# Patient Record
Sex: FEMALE | Race: BLACK OR AFRICAN AMERICAN | NOT HISPANIC OR LATINO | Employment: OTHER | URBAN - METROPOLITAN AREA
[De-identification: names, ages, dates, MRNs, and addresses within clinical notes are randomized per-mention and may not be internally consistent; named-entity substitution may affect disease eponyms.]

---

## 2017-11-28 ENCOUNTER — TRANSCRIBE ORDERS (OUTPATIENT)
Dept: ADMINISTRATIVE | Facility: HOSPITAL | Age: 72
End: 2017-11-28

## 2017-11-28 DIAGNOSIS — Z12.31 VISIT FOR SCREENING MAMMOGRAM: Primary | ICD-10-CM

## 2018-01-04 ENCOUNTER — HOSPITAL ENCOUNTER (OUTPATIENT)
Dept: MAMMOGRAPHY | Facility: HOSPITAL | Age: 73
Discharge: HOME | End: 2018-01-04
Admitting: INTERNAL MEDICINE

## 2018-01-04 DIAGNOSIS — Z12.31 VISIT FOR SCREENING MAMMOGRAM: ICD-10-CM

## 2018-01-04 PROCEDURE — 77067 SCR MAMMO BI INCL CAD: CPT

## 2018-04-10 ENCOUNTER — APPOINTMENT (OUTPATIENT)
Dept: CT IMAGING | Facility: HOSPITAL | Age: 73
End: 2018-04-10

## 2018-04-10 ENCOUNTER — HOSPITAL ENCOUNTER (INPATIENT)
Facility: HOSPITAL | Age: 73
LOS: 7 days | Discharge: ANOTHER HOSPITAL | End: 2018-04-18
Attending: EMERGENCY MEDICINE | Admitting: HOSPITALIST

## 2018-04-10 DIAGNOSIS — K57.91 DIVERTICULOSIS OF INTESTINE WITH BLEEDING, UNSPECIFIED INTESTINAL TRACT LOCATION: ICD-10-CM

## 2018-04-10 DIAGNOSIS — D62 ACUTE BLOOD LOSS ANEMIA: Primary | ICD-10-CM

## 2018-04-10 DIAGNOSIS — K57.11 DIVERTICULOSIS OF SMALL INTESTINE WITH HEMORRHAGE: ICD-10-CM

## 2018-04-10 LAB
ABO GROUP BLD: NORMAL
ALBUMIN SERPL-MCNC: 2.8 G/DL (ref 3.5–5.2)
ALBUMIN/GLOB SERPL: 0.9 G/DL
ALP SERPL-CCNC: 79 U/L (ref 39–117)
ALT SERPL W P-5'-P-CCNC: 11 U/L (ref 1–33)
ANION GAP SERPL CALCULATED.3IONS-SCNC: 15.3 MMOL/L
AST SERPL-CCNC: 18 U/L (ref 1–32)
BASOPHILS # BLD AUTO: 0.03 10*3/MM3 (ref 0–0.2)
BASOPHILS NFR BLD AUTO: 0.2 % (ref 0–1.5)
BILIRUB SERPL-MCNC: <0.2 MG/DL (ref 0.1–1.2)
BLD GP AB SCN SERPL QL: NEGATIVE
BUN BLD-MCNC: 30 MG/DL (ref 8–23)
BUN/CREAT SERPL: 25.9 (ref 7–25)
CALCIUM SPEC-SCNC: 8.2 MG/DL (ref 8.6–10.5)
CHLORIDE SERPL-SCNC: 110 MMOL/L (ref 98–107)
CO2 SERPL-SCNC: 22.7 MMOL/L (ref 22–29)
CREAT BLD-MCNC: 1.16 MG/DL (ref 0.57–1)
DEPRECATED RDW RBC AUTO: 56.2 FL (ref 37–54)
EOSINOPHIL # BLD AUTO: 0.16 10*3/MM3 (ref 0–0.7)
EOSINOPHIL NFR BLD AUTO: 1.2 % (ref 0.3–6.2)
ERYTHROCYTE [DISTWIDTH] IN BLOOD BY AUTOMATED COUNT: 17.8 % (ref 11.7–13)
GFR SERPL CREATININE-BSD FRML MDRD: 55 ML/MIN/1.73
GLOBULIN UR ELPH-MCNC: 3.1 GM/DL
GLUCOSE BLD-MCNC: 206 MG/DL (ref 65–99)
HCT VFR BLD AUTO: 15.8 % (ref 35.6–45.5)
HGB BLD-MCNC: 4.6 G/DL (ref 11.9–15.5)
IMM GRANULOCYTES # BLD: 0.1 10*3/MM3 (ref 0–0.03)
IMM GRANULOCYTES NFR BLD: 0.8 % (ref 0–0.5)
LYMPHOCYTES # BLD AUTO: 1.37 10*3/MM3 (ref 0.9–4.8)
LYMPHOCYTES NFR BLD AUTO: 10.4 % (ref 19.6–45.3)
MCH RBC QN AUTO: 26 PG (ref 26.9–32)
MCHC RBC AUTO-ENTMCNC: 29.1 G/DL (ref 32.4–36.3)
MCV RBC AUTO: 89.3 FL (ref 80.5–98.2)
MONOCYTES # BLD AUTO: 1.04 10*3/MM3 (ref 0.2–1.2)
MONOCYTES NFR BLD AUTO: 7.9 % (ref 5–12)
NEUTROPHILS # BLD AUTO: 10.44 10*3/MM3 (ref 1.9–8.1)
NEUTROPHILS NFR BLD AUTO: 79.5 % (ref 42.7–76)
NRBC BLD MANUAL-RTO: 2.3 /100 WBC (ref 0–0)
PLATELET # BLD AUTO: 403 10*3/MM3 (ref 140–500)
PMV BLD AUTO: 11.1 FL (ref 6–12)
POTASSIUM BLD-SCNC: 4.4 MMOL/L (ref 3.5–5.2)
PROT SERPL-MCNC: 5.9 G/DL (ref 6–8.5)
RBC # BLD AUTO: 1.77 10*6/MM3 (ref 3.9–5.2)
RH BLD: POSITIVE
SODIUM BLD-SCNC: 148 MMOL/L (ref 136–145)
T&S EXPIRATION DATE: NORMAL
WBC NRBC COR # BLD: 13.14 10*3/MM3 (ref 4.5–10.7)

## 2018-04-10 PROCEDURE — 80053 COMPREHEN METABOLIC PANEL: CPT | Performed by: NURSE PRACTITIONER

## 2018-04-10 PROCEDURE — 86900 BLOOD TYPING SEROLOGIC ABO: CPT | Performed by: NURSE PRACTITIONER

## 2018-04-10 PROCEDURE — 85025 COMPLETE CBC W/AUTO DIFF WBC: CPT | Performed by: NURSE PRACTITIONER

## 2018-04-10 PROCEDURE — 99284 EMERGENCY DEPT VISIT MOD MDM: CPT

## 2018-04-10 PROCEDURE — 86920 COMPATIBILITY TEST SPIN: CPT

## 2018-04-10 PROCEDURE — 86901 BLOOD TYPING SEROLOGIC RH(D): CPT | Performed by: NURSE PRACTITIONER

## 2018-04-10 PROCEDURE — 86850 RBC ANTIBODY SCREEN: CPT | Performed by: NURSE PRACTITIONER

## 2018-04-10 PROCEDURE — 74177 CT ABD & PELVIS W/CONTRAST: CPT

## 2018-04-10 PROCEDURE — 86923 COMPATIBILITY TEST ELECTRIC: CPT

## 2018-04-10 PROCEDURE — 25010000002 IOPAMIDOL 61 % SOLUTION: Performed by: EMERGENCY MEDICINE

## 2018-04-10 RX ORDER — ALLOPURINOL 300 MG/1
300 TABLET ORAL DAILY
COMMUNITY

## 2018-04-10 RX ORDER — FUROSEMIDE 40 MG/1
80 TABLET ORAL DAILY
COMMUNITY
End: 2018-04-18 | Stop reason: HOSPADM

## 2018-04-10 RX ORDER — METOLAZONE 2.5 MG/1
2.5 TABLET ORAL DAILY
COMMUNITY
End: 2018-04-18 | Stop reason: HOSPADM

## 2018-04-10 RX ORDER — HYDROCODONE BITARTRATE AND ACETAMINOPHEN 10; 325 MG/1; MG/1
1 TABLET ORAL EVERY 8 HOURS PRN
COMMUNITY
End: 2018-04-18 | Stop reason: HOSPADM

## 2018-04-10 RX ORDER — ATORVASTATIN CALCIUM 20 MG/1
20 TABLET, FILM COATED ORAL DAILY
COMMUNITY
End: 2018-04-18 | Stop reason: HOSPADM

## 2018-04-10 RX ORDER — ATENOLOL 100 MG/1
100 TABLET ORAL DAILY
COMMUNITY

## 2018-04-10 RX ORDER — LEVOTHYROXINE SODIUM 0.2 MG/1
200 TABLET ORAL DAILY
COMMUNITY

## 2018-04-10 RX ORDER — HYDRALAZINE HYDROCHLORIDE 100 MG/1
100 TABLET, FILM COATED ORAL 2 TIMES DAILY
COMMUNITY
End: 2018-04-18 | Stop reason: HOSPADM

## 2018-04-10 RX ORDER — SODIUM CHLORIDE 0.9 % (FLUSH) 0.9 %
10 SYRINGE (ML) INJECTION AS NEEDED
Status: DISCONTINUED | OUTPATIENT
Start: 2018-04-10 | End: 2018-04-18 | Stop reason: HOSPADM

## 2018-04-10 RX ORDER — MELOXICAM 15 MG/1
15 TABLET ORAL DAILY
COMMUNITY
End: 2018-04-18 | Stop reason: HOSPADM

## 2018-04-10 RX ADMIN — IOPAMIDOL 85 ML: 612 INJECTION, SOLUTION INTRAVENOUS at 23:41

## 2018-04-10 RX ADMIN — SODIUM CHLORIDE 1000 ML: 9 INJECTION, SOLUTION INTRAVENOUS at 23:32

## 2018-04-11 ENCOUNTER — INPATIENT HOSPITAL (OUTPATIENT)
Dept: URBAN - METROPOLITAN AREA HOSPITAL 113 | Facility: HOSPITAL | Age: 73
End: 2018-04-11

## 2018-04-11 DIAGNOSIS — D50.0 IRON DEFICIENCY ANEMIA SECONDARY TO BLOOD LOSS (CHRONIC): ICD-10-CM

## 2018-04-11 DIAGNOSIS — K92.1 MELENA: ICD-10-CM

## 2018-04-11 PROBLEM — K57.91 DIVERTICULOSIS OF INTESTINE WITH BLEEDING: Status: ACTIVE | Noted: 2018-04-10

## 2018-04-11 PROBLEM — D62 ACUTE BLOOD LOSS ANEMIA: Status: ACTIVE | Noted: 2018-04-11

## 2018-04-11 LAB
ABO + RH BLD: NORMAL
ABO + RH BLD: NORMAL
ANION GAP SERPL CALCULATED.3IONS-SCNC: 9.6 MMOL/L
BACTERIA UR QL AUTO: ABNORMAL /HPF
BASOPHILS # BLD AUTO: 0.03 10*3/MM3 (ref 0–0.2)
BASOPHILS NFR BLD AUTO: 0.3 % (ref 0–1.5)
BH BB BLOOD EXPIRATION DATE: NORMAL
BH BB BLOOD EXPIRATION DATE: NORMAL
BH BB BLOOD TYPE BARCODE: 5100
BH BB BLOOD TYPE BARCODE: 5100
BH BB DISPENSE STATUS: NORMAL
BH BB DISPENSE STATUS: NORMAL
BH BB PRODUCT CODE: NORMAL
BH BB PRODUCT CODE: NORMAL
BH BB UNIT NUMBER: NORMAL
BH BB UNIT NUMBER: NORMAL
BILIRUB UR QL STRIP: NEGATIVE
BUN BLD-MCNC: 24 MG/DL (ref 8–23)
BUN/CREAT SERPL: 24.5 (ref 7–25)
CALCIUM SPEC-SCNC: 7.8 MG/DL (ref 8.6–10.5)
CHLORIDE SERPL-SCNC: 108 MMOL/L (ref 98–107)
CLARITY UR: ABNORMAL
CO2 SERPL-SCNC: 23.4 MMOL/L (ref 22–29)
COLOR UR: YELLOW
CREAT BLD-MCNC: 0.98 MG/DL (ref 0.57–1)
CROSSMATCH INTERPRETATION: NORMAL
CROSSMATCH INTERPRETATION: NORMAL
DEPRECATED RDW RBC AUTO: 50.4 FL (ref 37–54)
EOSINOPHIL # BLD AUTO: 0.1 10*3/MM3 (ref 0–0.7)
EOSINOPHIL NFR BLD AUTO: 1.1 % (ref 0.3–6.2)
ERYTHROCYTE [DISTWIDTH] IN BLOOD BY AUTOMATED COUNT: 15.9 % (ref 11.7–13)
GFR SERPL CREATININE-BSD FRML MDRD: 67 ML/MIN/1.73
GLUCOSE BLD-MCNC: 100 MG/DL (ref 65–99)
GLUCOSE BLDC GLUCOMTR-MCNC: 118 MG/DL (ref 70–130)
GLUCOSE UR STRIP-MCNC: NEGATIVE MG/DL
HCT VFR BLD AUTO: 20.7 % (ref 35.6–45.5)
HCT VFR BLD AUTO: 21.1 % (ref 35.6–45.5)
HGB BLD-MCNC: 6.4 G/DL (ref 11.9–15.5)
HGB BLD-MCNC: 7 G/DL (ref 11.9–15.5)
HGB UR QL STRIP.AUTO: NEGATIVE
HYALINE CASTS UR QL AUTO: ABNORMAL /LPF
IMM GRANULOCYTES # BLD: 0.08 10*3/MM3 (ref 0–0.03)
IMM GRANULOCYTES NFR BLD: 0.9 % (ref 0–0.5)
KETONES UR QL STRIP: NEGATIVE
LEUKOCYTE ESTERASE UR QL STRIP.AUTO: ABNORMAL
LYMPHOCYTES # BLD AUTO: 1.21 10*3/MM3 (ref 0.9–4.8)
LYMPHOCYTES NFR BLD AUTO: 13.9 % (ref 19.6–45.3)
MCH RBC QN AUTO: 27.2 PG (ref 26.9–32)
MCHC RBC AUTO-ENTMCNC: 30.9 G/DL (ref 32.4–36.3)
MCV RBC AUTO: 88.1 FL (ref 80.5–98.2)
MONOCYTES # BLD AUTO: 0.74 10*3/MM3 (ref 0.2–1.2)
MONOCYTES NFR BLD AUTO: 8.5 % (ref 5–12)
NEUTROPHILS # BLD AUTO: 6.56 10*3/MM3 (ref 1.9–8.1)
NEUTROPHILS NFR BLD AUTO: 75.3 % (ref 42.7–76)
NITRITE UR QL STRIP: POSITIVE
NRBC BLD MANUAL-RTO: 2.8 /100 WBC (ref 0–0)
PH UR STRIP.AUTO: 5.5 [PH] (ref 5–8)
PLATELET # BLD AUTO: 261 10*3/MM3 (ref 140–500)
PMV BLD AUTO: 11 FL (ref 6–12)
POTASSIUM BLD-SCNC: 4.3 MMOL/L (ref 3.5–5.2)
PROT UR QL STRIP: ABNORMAL
RBC # BLD AUTO: 2.35 10*6/MM3 (ref 3.9–5.2)
RBC # UR: ABNORMAL /HPF
REF LAB TEST METHOD: ABNORMAL
SODIUM BLD-SCNC: 141 MMOL/L (ref 136–145)
SP GR UR STRIP: 1.01 (ref 1–1.03)
SQUAMOUS #/AREA URNS HPF: ABNORMAL /HPF
UNIT  ABO: NORMAL
UNIT  ABO: NORMAL
UNIT  RH: NORMAL
UNIT  RH: NORMAL
UROBILINOGEN UR QL STRIP: ABNORMAL
WBC NRBC COR # BLD: 8.72 10*3/MM3 (ref 4.5–10.7)
WBC UR QL AUTO: ABNORMAL /HPF

## 2018-04-11 PROCEDURE — 25010000002 FUROSEMIDE PER 20 MG: Performed by: INTERNAL MEDICINE

## 2018-04-11 PROCEDURE — 85018 HEMOGLOBIN: CPT | Performed by: INTERNAL MEDICINE

## 2018-04-11 PROCEDURE — 86901 BLOOD TYPING SEROLOGIC RH(D): CPT

## 2018-04-11 PROCEDURE — 99222 1ST HOSP IP/OBS MODERATE 55: CPT | Performed by: INTERNAL MEDICINE

## 2018-04-11 PROCEDURE — 85025 COMPLETE CBC W/AUTO DIFF WBC: CPT | Performed by: INTERNAL MEDICINE

## 2018-04-11 PROCEDURE — 81001 URINALYSIS AUTO W/SCOPE: CPT | Performed by: NURSE PRACTITIONER

## 2018-04-11 PROCEDURE — 25010000002 ONDANSETRON PER 1 MG: Performed by: NURSE PRACTITIONER

## 2018-04-11 PROCEDURE — 86900 BLOOD TYPING SEROLOGIC ABO: CPT

## 2018-04-11 PROCEDURE — 36430 TRANSFUSION BLD/BLD COMPNT: CPT

## 2018-04-11 PROCEDURE — 25010000002 MORPHINE PER 10 MG: Performed by: NURSE PRACTITIONER

## 2018-04-11 PROCEDURE — P9016 RBC LEUKOCYTES REDUCED: HCPCS

## 2018-04-11 PROCEDURE — 80048 BASIC METABOLIC PNL TOTAL CA: CPT | Performed by: INTERNAL MEDICINE

## 2018-04-11 PROCEDURE — 82962 GLUCOSE BLOOD TEST: CPT

## 2018-04-11 PROCEDURE — 85014 HEMATOCRIT: CPT | Performed by: INTERNAL MEDICINE

## 2018-04-11 RX ORDER — ATENOLOL 50 MG/1
100 TABLET ORAL
Status: DISCONTINUED | OUTPATIENT
Start: 2018-04-11 | End: 2018-04-18 | Stop reason: HOSPADM

## 2018-04-11 RX ORDER — ATORVASTATIN CALCIUM 20 MG/1
20 TABLET, FILM COATED ORAL NIGHTLY
Status: DISCONTINUED | OUTPATIENT
Start: 2018-04-11 | End: 2018-04-16

## 2018-04-11 RX ORDER — MAGNESIUM CARB/ALUMINUM HYDROX 105-160MG
888 TABLET,CHEWABLE ORAL ONCE
Status: COMPLETED | OUTPATIENT
Start: 2018-04-11 | End: 2018-04-11

## 2018-04-11 RX ORDER — DEXTROSE MONOHYDRATE 25 G/50ML
25 INJECTION, SOLUTION INTRAVENOUS
Status: DISCONTINUED | OUTPATIENT
Start: 2018-04-11 | End: 2018-04-18

## 2018-04-11 RX ORDER — METOLAZONE 2.5 MG/1
2.5 TABLET ORAL DAILY
Status: DISCONTINUED | OUTPATIENT
Start: 2018-04-11 | End: 2018-04-16

## 2018-04-11 RX ORDER — FUROSEMIDE 40 MG/1
40 TABLET ORAL
Status: DISCONTINUED | OUTPATIENT
Start: 2018-04-11 | End: 2018-04-11

## 2018-04-11 RX ORDER — ONDANSETRON 2 MG/ML
4 INJECTION INTRAMUSCULAR; INTRAVENOUS ONCE
Status: COMPLETED | OUTPATIENT
Start: 2018-04-11 | End: 2018-04-11

## 2018-04-11 RX ORDER — HYDROCODONE BITARTRATE AND ACETAMINOPHEN 10; 325 MG/1; MG/1
1 TABLET ORAL EVERY 8 HOURS PRN
Status: DISCONTINUED | OUTPATIENT
Start: 2018-04-11 | End: 2018-04-18

## 2018-04-11 RX ORDER — LEVOTHYROXINE SODIUM 0.1 MG/1
200 TABLET ORAL
Status: DISCONTINUED | OUTPATIENT
Start: 2018-04-11 | End: 2018-04-18 | Stop reason: HOSPADM

## 2018-04-11 RX ORDER — HYDRALAZINE HYDROCHLORIDE 50 MG/1
100 TABLET, FILM COATED ORAL EVERY 12 HOURS SCHEDULED
Status: DISCONTINUED | OUTPATIENT
Start: 2018-04-11 | End: 2018-04-17

## 2018-04-11 RX ORDER — FUROSEMIDE 10 MG/ML
20 INJECTION INTRAMUSCULAR; INTRAVENOUS ONCE
Status: COMPLETED | OUTPATIENT
Start: 2018-04-11 | End: 2018-04-11

## 2018-04-11 RX ORDER — ALLOPURINOL 300 MG/1
300 TABLET ORAL DAILY
Status: DISCONTINUED | OUTPATIENT
Start: 2018-04-11 | End: 2018-04-18 | Stop reason: HOSPADM

## 2018-04-11 RX ORDER — MORPHINE SULFATE 2 MG/ML
2 INJECTION, SOLUTION INTRAMUSCULAR; INTRAVENOUS ONCE
Status: COMPLETED | OUTPATIENT
Start: 2018-04-11 | End: 2018-04-11

## 2018-04-11 RX ORDER — POTASSIUM CHLORIDE 750 MG/1
20 CAPSULE, EXTENDED RELEASE ORAL 2 TIMES DAILY
Status: DISCONTINUED | OUTPATIENT
Start: 2018-04-11 | End: 2018-04-16

## 2018-04-11 RX ORDER — NICOTINE POLACRILEX 4 MG
15 LOZENGE BUCCAL
Status: DISCONTINUED | OUTPATIENT
Start: 2018-04-11 | End: 2018-04-18

## 2018-04-11 RX ADMIN — ONDANSETRON 4 MG: 2 INJECTION INTRAMUSCULAR; INTRAVENOUS at 00:20

## 2018-04-11 RX ADMIN — METOLAZONE 2.5 MG: 2.5 TABLET ORAL at 10:35

## 2018-04-11 RX ADMIN — LEVOTHYROXINE SODIUM 200 MCG: 100 TABLET ORAL at 05:52

## 2018-04-11 RX ADMIN — MORPHINE SULFATE 2 MG: 2 INJECTION, SOLUTION INTRAMUSCULAR; INTRAVENOUS at 00:20

## 2018-04-11 RX ADMIN — ALLOPURINOL 300 MG: 300 TABLET ORAL at 10:34

## 2018-04-11 RX ADMIN — ACETAMINOPHEN,PHENIRAMINE MALEATE, PHENYLEPHRINE HYDROCHLORIDE 888 ML: 650; 20; 10 POWDER, FOR SUSPENSION ORAL at 13:00

## 2018-04-11 RX ADMIN — POTASSIUM CHLORIDE 20 MEQ: 750 CAPSULE, EXTENDED RELEASE ORAL at 10:34

## 2018-04-11 RX ADMIN — ATENOLOL 100 MG: 50 TABLET ORAL at 10:34

## 2018-04-11 RX ADMIN — HYDRALAZINE HYDROCHLORIDE 100 MG: 50 TABLET, FILM COATED ORAL at 20:13

## 2018-04-11 RX ADMIN — ATORVASTATIN CALCIUM 20 MG: 20 TABLET, FILM COATED ORAL at 20:13

## 2018-04-11 RX ADMIN — HYDRALAZINE HYDROCHLORIDE 100 MG: 50 TABLET, FILM COATED ORAL at 10:34

## 2018-04-11 RX ADMIN — FUROSEMIDE 20 MG: 10 INJECTION, SOLUTION INTRAMUSCULAR; INTRAVENOUS at 10:35

## 2018-04-11 RX ADMIN — POTASSIUM CHLORIDE 20 MEQ: 750 CAPSULE, EXTENDED RELEASE ORAL at 20:13

## 2018-04-11 RX ADMIN — HYDROCODONE BITARTRATE AND ACETAMINOPHEN 1 TABLET: 10; 325 TABLET ORAL at 20:13

## 2018-04-11 NOTE — CONSULTS
Inpatient Gastroenterology Consult  Consult performed by: LANEY SAMAYOA  Consult ordered by: ROSALINE RICK          Patient Care Team:  Ankita Martin MD as PCP - General (Internal Medicine)    Chief complaint: gastrointestinal bleed    Subjective     History of Present Illness  Pleasant lady presenting with black and maroon stools had upper and lower tract evaluation last week, told she had a diverticular bleed.  She denies any n/v.  When she presented last week had consider amount of maroon blood passed.   Things did improve, but now she is bleeding with bowel movements and found to be profoundly anemic , no abdominal pain or abnormal weight loss.   Review of Systems   Constitutional: Positive for fatigue.   HENT: Negative.    Gastrointestinal: Positive for abdominal distention and blood in stool.        Past Medical History:   Diagnosis Date   • Anemia    • Arthritis     gouty   • Diabetes mellitus    • Disease of thyroid gland    • GI bleed    • Hyperlipidemia    • Hypertension    • Injury of back     sciatica   ,   Past Surgical History:   Procedure Laterality Date   • CHOLECYSTECTOMY     • HYSTERECTOMY     • NEPHRECTOMY Right    • THYROID SURGERY     ,   Family History   Problem Relation Age of Onset   • No Known Problems Mother    • No Known Problems Father    • No Known Problems Sister    • No Known Problems Brother    • No Known Problems Daughter    • No Known Problems Son    • No Known Problems Maternal Grandmother    • No Known Problems Paternal Grandmother    • No Known Problems Maternal Aunt    • No Known Problems Paternal Aunt    • BRCA 1/2 Neg Hx    • Breast cancer Neg Hx    • Colon cancer Neg Hx    • Endometrial cancer Neg Hx    • Ovarian cancer Neg Hx    ,   Social History   Substance Use Topics   • Smoking status: Former Smoker   • Smokeless tobacco: Never Used      Comment: 31 yrs as of 2018   • Alcohol use Yes      Comment: wine social   ,   Prescriptions Prior to Admission   Medication Sig  Dispense Refill Last Dose   • allopurinol (ZYLOPRIM) 300 MG tablet Take 300 mg by mouth Daily.   4/10/2018 at Unknown time   • atenolol (TENORMIN) 100 MG tablet Take 100 mg by mouth Daily.   4/10/2018 at Unknown time   • atorvastatin (LIPITOR) 20 MG tablet Take 20 mg by mouth Daily.   4/10/2018 at Unknown time   • furosemide (LASIX) 40 MG tablet Take 80 mg by mouth Daily.   4/9/2018   • hydrALAZINE (APRESOLINE) 100 MG tablet Take 100 mg by mouth 2 (Two) Times a Day.   4/10/2018 at Unknown time   • HYDROcodone-acetaminophen (NORCO)  MG per tablet Take 1 tablet by mouth Every 8 (Eight) Hours As Needed for Moderate Pain .   4/10/2018 at 1600   • levothyroxine (SYNTHROID, LEVOTHROID) 200 MCG tablet Take 200 mcg by mouth Daily.   4/10/2018 at Unknown time   • meloxicam (MOBIC) 15 MG tablet Take 15 mg by mouth Daily.   4/10/2018 at Unknown time   • metFORMIN (GLUCOPHAGE) 500 MG tablet Take 500 mg by mouth Daily With Breakfast.   4/10/2018 at Unknown time   • metFORMIN (GLUCOPHAGE) 500 MG tablet Take 1,000 mg by mouth Every Evening.   4/10/2018 at Unknown time   • metOLazone (ZAROXOLYN) 2.5 MG tablet Take 2.5 mg by mouth Daily.   4/10/2018 at Unknown time   • Potassium Chloride (KLOR-CON PO) Take 20 mEq by mouth 2 (Two) Times a Day.   4/10/2018 at Unknown time   , Scheduled Meds:    allopurinol 300 mg Oral Daily   atenolol 100 mg Oral Q24H   atorvastatin 20 mg Oral Nightly   hydrALAZINE 100 mg Oral Q12H   insulin aspart 0-7 Units Subcutaneous Q6H   levothyroxine 200 mcg Oral Q AM   magnesium citrate 888 mL Oral Once   metOLazone 2.5 mg Oral Daily   potassium chloride 20 mEq Oral BID   , Continuous Infusions:   , PRN Meds:  dextrose  •  dextrose  •  glucagon (human recombinant)  •  HYDROcodone-acetaminophen  •  Insert peripheral IV **AND** sodium chloride and Allergies:  Review of patient's allergies indicates no known allergies.    Objective      Vital Signs  Temp:  [98 °F (36.7 °C)-98.9 °F (37.2 °C)] 98.9 °F (37.2  "°C)  Heart Rate:  [] 71  Resp:  [16-18] 18  BP: (105-175)/(44-72) 140/65    Physical Exam   Constitutional: She is oriented to person, place, and time. She appears well-developed and well-nourished.   HENT:   Mouth/Throat: Oropharynx is clear and moist.   Eyes: Conjunctivae are normal.   Neck: Neck supple.   Cardiovascular: Normal rate and regular rhythm.    Pulmonary/Chest: Effort normal and breath sounds normal.   Abdominal: Soft. Bowel sounds are normal.   Neurological: She is alert and oriented to person, place, and time.   Skin: Skin is warm and dry.   Psychiatric: She has a normal mood and affect.       Results Review:    I reviewed the patient's new clinical results.        Assessment/Plan     Active Problems:    Acute blood loss anemia      Assessment:  (Gastrointestinal bleeding certainly this may well be a diverticular bleed.  Also need to consider vascular lesions, dieulafoy lesion as well.  ).     Plan:   (If patient develops a \"gush\" of blood, becomes unstable will need a stat bleeding scan and consider possible angiography at another institution if test is positive    Certainly consideration for colon resection needs to be entertained if all other sources of bleeding are excluded    Plan a small bowel enteroscopy  And colonoscopy tomorrow.  Want to exclude vascular lesions, dieulafoy lesion.   ( may require eventual outpatient capsule evaluation .   Reviewed with patient and rn at length. ).       I discussed the patients findings and my recommendations with patient and nursing staff    Winston Esteves MD  04/11/18  12:25 PM    Time: Critical care 25 min      "

## 2018-04-11 NOTE — PLAN OF CARE
Problem: Patient Care Overview  Goal: Plan of Care Review   04/11/18 6741   Coping/Psychosocial   Plan of Care Reviewed With patient;spouse   Plan of Care Review   Progress no change   OTHER   Outcome Summary hemoglobin trending up; GI consulted today; plan for scope tomorrow; clear liquids until midnt then npo; no c/o pain or nausea     Goal: Individualization and Mutuality  Outcome: Ongoing (interventions implemented as appropriate)    Goal: Discharge Needs Assessment  Outcome: Ongoing (interventions implemented as appropriate)    Goal: Interprofessional Rounds/Family Conf  Outcome: Ongoing (interventions implemented as appropriate)      Problem: Anemia (Adult)  Goal: Identify Related Risk Factors and Signs and Symptoms  Outcome: Ongoing (interventions implemented as appropriate)    Goal: Symptom Improvement  Outcome: Ongoing (interventions implemented as appropriate)      Problem: Fall Risk (Adult)  Goal: Identify Related Risk Factors and Signs and Symptoms  Outcome: Ongoing (interventions implemented as appropriate)    Goal: Absence of Fall  Outcome: Ongoing (interventions implemented as appropriate)

## 2018-04-11 NOTE — H&P
HISTORY AND PHYSICAL   [facility]        Patient Identification:  Name: Saira Boateng  Age: 73 y.o.  Sex: female  :  1945  MRN: [id number]                     Primary Care Physician: Ankita Martin MD    Chief Complaint:  anemia  History of Present Illness:   74 yo bf presented to ED with c/o decreased H/H on labs per PCP. Was just hospitalized at  last week with GI bleed, no source found. Had EGD, C-scope then.Was told she had diverticulosis.Reports dark, tarry stool which has not resolved.No abd pain/N/V. Mild dyspnea.HgB was 4.6 in ED. Received 2 units PRBCs overnight. Denies h/o CHF. States she take a water pill every other day due to chronic LE edema.    Past Medical History:  Past Medical History:   Diagnosis Date   • Anemia    • Arthritis     gouty   • Diabetes mellitus    • Disease of thyroid gland    • GI bleed    • Hyperlipidemia    • Hypertension    • Injury of back     sciatica     Past Surgical History:  Past Surgical History:   Procedure Laterality Date   • CHOLECYSTECTOMY     • HYSTERECTOMY     • NEPHRECTOMY Right    • THYROID SURGERY        Home Meds:  Prescriptions Prior to Admission   Medication Sig Dispense Refill Last Dose   • allopurinol (ZYLOPRIM) 300 MG tablet Take 300 mg by mouth Daily.   4/10/2018 at Unknown time   • atenolol (TENORMIN) 100 MG tablet Take 100 mg by mouth Daily.   4/10/2018 at Unknown time   • atorvastatin (LIPITOR) 20 MG tablet Take 20 mg by mouth Daily.   4/10/2018 at Unknown time   • furosemide (LASIX) 40 MG tablet Take 80 mg by mouth Daily.   2018   • hydrALAZINE (APRESOLINE) 100 MG tablet Take 100 mg by mouth 2 (Two) Times a Day.   4/10/2018 at Unknown time   • HYDROcodone-acetaminophen (NORCO)  MG per tablet Take 1 tablet by mouth Every 8 (Eight) Hours As Needed for Moderate Pain .   4/10/2018 at 1600   • levothyroxine (SYNTHROID, LEVOTHROID) 200 MCG tablet Take 200 mcg by mouth Daily.   4/10/2018 at Unknown time   • meloxicam  (MOBIC) 15 MG tablet Take 15 mg by mouth Daily.   4/10/2018 at Unknown time   • metFORMIN (GLUCOPHAGE) 500 MG tablet Take 500 mg by mouth Daily With Breakfast.   4/10/2018 at Unknown time   • metFORMIN (GLUCOPHAGE) 500 MG tablet Take 1,000 mg by mouth Every Evening.   4/10/2018 at Unknown time   • metOLazone (ZAROXOLYN) 2.5 MG tablet Take 2.5 mg by mouth Daily.   4/10/2018 at Unknown time   • Potassium Chloride (KLOR-CON PO) Take 20 mEq by mouth 2 (Two) Times a Day.   4/10/2018 at Unknown time       Allergies:  No Known Allergies  Immunizations:    There is no immunization history on file for this patient.  Social History:   Social History     Social History Narrative   • No narrative on file     Social History     Social History   • Marital status:      Spouse name: N/A   • Number of children: N/A   • Years of education: N/A     Occupational History   • Not on file.     Social History Main Topics   • Smoking status: Former Smoker   • Smokeless tobacco: Never Used      Comment: 31 yrs as of 2018   • Alcohol use Yes      Comment: wine social   • Drug use: No   • Sexual activity: Defer     Other Topics Concern   • Not on file     Social History Narrative   • No narrative on file       Family History:  Family History   Problem Relation Age of Onset   • No Known Problems Mother    • No Known Problems Father    • No Known Problems Sister    • No Known Problems Brother    • No Known Problems Daughter    • No Known Problems Son    • No Known Problems Maternal Grandmother    • No Known Problems Paternal Grandmother    • No Known Problems Maternal Aunt    • No Known Problems Paternal Aunt    • BRCA 1/2 Neg Hx    • Breast cancer Neg Hx    • Colon cancer Neg Hx    • Endometrial cancer Neg Hx    • Ovarian cancer Neg Hx         Review of Systems  See history of present illness and past medical history.  Patient denies headache, dizziness, syncope, falls, trauma, change in vision, change in hearing, change in taste,  "changes in weight, changes in appetite, focal weakness, numbness, or paresthesia.  Patient denies chest pain, palpitations, orthopnea, PND, cough, sinus pressure, rhinorrhea, epistaxis, hemoptysis, nausea, vomiting,hematemesis, diarrhea, constipation or hematchezia.  Denies cold or heat intolerance, polydipsia, polyuria, polyphagia. Denies hematuria, pyuria, dysuria, hesitancy, frequency or urgency. Denies consumption of raw and under cooked meats foods or change in water source.  Denies fever, chills, sweats, night sweats.  Denies missing any routine medications. Remainder of ROS is negative.    Objective:  tMax 24 hrs: Temp (24hrs), Av.5 °F (36.9 °C), Min:98 °F (36.7 °C), Max:98.9 °F (37.2 °C)    Vitals Ranges:   Temp:  [98 °F (36.7 °C)-98.9 °F (37.2 °C)] 98.9 °F (37.2 °C)  Heart Rate:  [] 71  Resp:  [16-18] 18  BP: (105-175)/(44-72) 140/65      Exam:  /65 (BP Location: Right arm, Patient Position: Lying)   Pulse 71   Temp 98.9 °F (37.2 °C) (Oral)   Resp 18   Ht 157.5 cm (62\")   Wt 99.8 kg (220 lb)   SpO2 95%   BMI 40.24 kg/m²     General Appearance:    Alert, cooperative, no distress, appears stated age   Head:    Normocephalic, without obvious abnormality, atraumatic   Eyes:    PERRL, conjunctiva/corneas clear, EOM's intact, both eyes   Ears:    Normal external ear canals, both ears   Nose:   Nares normal, septum midline, mucosa normal, no drainage    or sinus tenderness   Throat:   Lips, mucosa, and tongue normal   Neck:   Supple, symmetrical, trachea midline, no adenopathy;     thyroid:  no enlargement/tenderness/nodules; no carotid    bruit or JVD   Back:     no CVA tenderness   Lungs:     Clear to auscultation, decreased at bases bilaterally, respirations unlabored   Chest Wall:    No tenderness or deformity    Heart:    Regular rate and rhythm, S1 and S2 normal, no murmur, rub   or gallop   Abdomen:     Obese. Soft, non-tender, bowel sounds active all four quadrants,  no masses, no " hepatomegaly, no splenomegaly   Extremities:   Extremities normal, atraumatic, no cyanosis. LE edema L>R   Pulses:   2+ and symmetric all extremities   Skin:   Skin color, texture, turgor normal, no rashes or lesions   Lymph nodes:   Cervical, supraclavicular, and axillary nodes normal   Neurologic:   CNII-XII grossly intact, normal strength, sensation grossly intact throughout      .    Data Review:  Lab Results   Component Value Date    WBC 8.72 04/11/2018    HGB 6.4 (C) 04/11/2018    HCT 20.7 (C) 04/11/2018     04/11/2018     Lab Results   Component Value Date     04/11/2018    K 4.3 04/11/2018     (H) 04/11/2018    CO2 23.4 04/11/2018    BUN 24 (H) 04/11/2018    CREATININE 0.98 04/11/2018    GLUCOSE 100 (H) 04/11/2018     Lab Results   Component Value Date    CALCIUM 7.8 (L) 04/11/2018     Lab Results   Component Value Date    AST 18 04/10/2018    ALT 11 04/10/2018    ALKPHOS 79 04/10/2018     No results found for: APTT, INR  Lab Results   Component Value Date    COLORU Yellow 04/11/2018    CLARITYU Cloudy (A) 04/11/2018    PHUR 5.5 04/11/2018    GLUCOSEU Negative 04/11/2018    KETONESU Negative 04/11/2018    BLOODU Negative 04/11/2018    LEUKOCYTESUR Small (1+) (A) 04/11/2018    BILIRUBINUR Negative 04/11/2018    UROBILINOGEN 0.2 E.U./dL 04/11/2018    RBCUA 3-5 (A) 04/11/2018    WBCUA 21-30 (A) 04/11/2018    BACTERIA 4+ (A) 04/11/2018     No results found for: TROPONINT, TROPONINI, BNP  No components found for: HGBA1C;2  No components found for: TSH;2             Imaging Results (all)     Procedure Component Value Units Date/Time    CT Abdomen Pelvis With Contrast [[id number]] Collected:  04/10/18 2359     Updated:  04/11/18 0007    Narrative:       CT SCANS ABDOMEN AND PELVIS WITH IV CONTRAST.     HISTORY: Diverticulitis, known, follow up     COMPARISON: June 29, 2014.     TECHNIQUE: Radiation dose reduction techniques were utilized, including  automated exposure control and exposure  modulation based on body size.  Axial images were obtained from the lung bases to the symphysis pubis  with IV contrast only.. Oral contrast was not administered per request.     FINDINGS ABDOMEN CT:  Heart is enlarged. Gallbladder surgically absent.  Right kidney is absent. A couple of tiny low-density renal lesions are  present, well less than 1 cm. The lesions are too small for accurate or  detailed assessment. Small cysts are favored, particularly if there is  no history of malignancy.  Suggest follow-up to ensure stability. A 2.4  cm low density splenic lesion has developed with slight convex bulge  along the splenic hilum, images 27-32. It does not appear to reflect a  cyst. Favor benign etiology but Recommend follow-up. Remaining solid  organs have an unremarkable appearance.     FINDINGS PELVIS CT:  Absent uterus. Normal appendix. Extensive colonic  diverticulosis. The GI tract not opacified for assessment but non  obstructive in appearance. There is no ascites. Aorta ectatic but  nonaneurysmal. Extensive degenerative changes in the lumbar spine.                Impression:       IMPRESSION :   1. 2.4 cm low density splenic lesion. Favor benign etiology but  recommend follow-up to ensure stability.  2. Tiny left renal lesions as discussed.  3. Extensive diverticulosis        This report was finalized on 4/11/2018 12:04 AM by Emery Abbott MD.           Patient Active Problem List   Diagnosis Code   • Acute blood loss anemia D62       Assessment:  Active Problems:    Acute blood loss anemia    GI bleed    HTN    DM    Abnormal u/a    Plan:  Admitted to monitored unit  NPO  IV lasix x 1 today  ECHO  GI consult  Monitor H/H  Supplemental O2  Monitor renal function and electrolytes  Transfuse additional units prn  Hold metformin, start ss insulin  Watch for signs of volume overload    Guera Bishop MD  4/11/2018  9:09 AM

## 2018-04-11 NOTE — ED NOTES
Pt was sent over by pmd for abnormal labs. Pt states that she was discharged from [facility] on Friday for gi blee. Pt received blood and had scope done. Pt was released with hgb 8.6. Pt had follow up blood work done and was told hgb was 4. Pt c/o some abd discomfort and continued dark stools with bowel movements.      Sally Henriquez RN  04/10/18 4675

## 2018-04-11 NOTE — ED PROVIDER NOTES
Pt presents to the ED for evaluation of anemia. Pt states that she was d/c from [facility] last week after an admission for a GI bleed. Pt states that she received 3 units of blood during the admission. Pt states that her PMD called her today and told her that her hgb was 4. Pt denies abd pain or SOA. On exam, Pt is resting comfortably, in no distress, and without focal neurologic deficit. Cardiovascular exam is within normal limits and without murmur. Abd is soft and non tender. Hgb=4.6. I agree with the plan for transfusion and admission.     Attestation:  The MARTIR and I have discussed this patient's history, physical exam, and treatment plan.  I have reviewed the documentation and personally had a face to face interaction with the patient. I affirm the documentation and agree with the treatment and plan.  The attached note describes my personal findings.      Documentation assistance provided by amy Coronado for Dr Reese. Information recorded by the scribe was done at my direction and has been verified and validated by me.     Kylah Coronado  04/10/18 2466       Marc Reese MD  04/11/18 0037

## 2018-04-11 NOTE — ED PROVIDER NOTES
EMERGENCY DEPARTMENT ENCOUNTER    Room Number:  607/1  Date seen:  4/11/2018  Time seen: 10:39 PM  PCP: Ankita Martin MD    HPI:  Chief complaint:abnormal lab  Context:Saira Boateng is a 73 y.o. female who presents to the ED with c/o an abnormal lab. Pt reports that she visited her PCP earlier today and had labs completed. She was called and told that her Hgb was 4 and needed to attend the ER immediately. She denies any pain currently as well as nausea or vomiting. She does admit to a dark/tarry stool earlier today. She was discharged from [facility] last week after a GI bleed and had a Hgb of 8.4 when discharged. While at [facility] it reached as low as 6 and she was given blood 3x. Her GI bleed was diagnosed as diverticulosis, but she did not receive abx for this.    Timing:constant  Duration: today  Location:blood/body  Radiation:none  Quality:low Hgb  Intensity/Severity:severe  Associated Symptoms:dark/tarry stool  Aggravating Factors:none  Alleviating Factors:none  Previous Episodes:Pt Hgb reached as low as 6 last week.  Treatment before arrival:No tx prior to arrival.    MEDICAL RECORD REVIEW    ALLERGIES  Review of patient's allergies indicates no known allergies.    PAST MEDICAL HISTORY  Active Ambulatory Problems     Diagnosis Date Noted   • No Active Ambulatory Problems     Resolved Ambulatory Problems     Diagnosis Date Noted   • No Resolved Ambulatory Problems     Past Medical History:   Diagnosis Date   • Anemia    • Arthritis    • Diabetes mellitus    • Disease of thyroid gland    • GI bleed    • Hyperlipidemia    • Hypertension    • Injury of back        PAST SURGICAL HISTORY  Past Surgical History:   Procedure Laterality Date   • CHOLECYSTECTOMY     • HYSTERECTOMY     • NEPHRECTOMY Right    • THYROID SURGERY         FAMILY HISTORY  Family History   Problem Relation Age of Onset   • No Known Problems Mother    • No Known Problems Father    • No Known Problems Sister    • No Known Problems Brother    •  No Known Problems Daughter    • No Known Problems Son    • No Known Problems Maternal Grandmother    • No Known Problems Paternal Grandmother    • No Known Problems Maternal Aunt    • No Known Problems Paternal Aunt    • BRCA 1/2 Neg Hx    • Breast cancer Neg Hx    • Colon cancer Neg Hx    • Endometrial cancer Neg Hx    • Ovarian cancer Neg Hx        SOCIAL HISTORY  Social History     Social History   • Marital status:      Spouse name: N/A   • Number of children: N/A   • Years of education: N/A     Occupational History   • Not on file.     Social History Main Topics   • Smoking status: Former Smoker   • Smokeless tobacco: Never Used      Comment: 31 yrs as of 2018   • Alcohol use Yes      Comment: wine social   • Drug use: No   • Sexual activity: Defer     Other Topics Concern   • Not on file     Social History Narrative   • No narrative on file       REVIEW OF SYSTEMS  Review of Systems   Constitutional: Negative for activity change, appetite change, diaphoresis and fever.   HENT: Negative for trouble swallowing.    Eyes: Negative for visual disturbance.   Respiratory: Negative for cough, chest tightness, shortness of breath and wheezing.    Cardiovascular: Negative for chest pain, palpitations and leg swelling.   Gastrointestinal: Positive for blood in stool. Negative for abdominal pain, diarrhea, nausea and vomiting.   Genitourinary: Negative for dysuria.   Musculoskeletal: Negative for back pain.   Skin: Negative for rash.   Neurological: Negative for dizziness, speech difficulty and light-headedness.   Hematological:        Hgb of 4       PHYSICAL EXAM  ED Triage Vitals   Temp Heart Rate Resp BP SpO2   04/10/18 2139 04/10/18 2137 04/10/18 2137 04/10/18 2206 04/10/18 2137   98 °F (36.7 °C) 102 18 175/69 100 %      Temp src Heart Rate Source Patient Position BP Location FiO2 (%)   04/10/18 2139 -- -- -- --   Tympanic         Physical Exam   Constitutional: She is oriented to person, place, and time and  well-developed, well-nourished, and in no distress. No distress.   HENT:   Head: Normocephalic and atraumatic.   Mouth/Throat: Uvula is midline and mucous membranes are normal.   Neck: Normal range of motion. Neck supple.   Cardiovascular: S1 normal, S2 normal and normal heart sounds.  Exam reveals no gallop and no friction rub.    No murmur heard.  Pulmonary/Chest: Effort normal and breath sounds normal. She has no decreased breath sounds. She has no wheezes. She has no rhonchi. She has no rales.   Abdominal: Soft. Normal appearance and bowel sounds are normal. There is no tenderness. There is no rebound and no guarding.   Musculoskeletal: Normal range of motion.   Neurological: She is alert and oriented to person, place, and time.   Skin: Skin is warm, dry and intact.   Psychiatric: Affect and judgment normal.   Nursing note and vitals reviewed.      LAB RESULTS  Recent Results (from the past 24 hour(s))   Comprehensive Metabolic Panel    Collection Time: 04/10/18 10:06 PM   Result Value Ref Range    Glucose 206 (H) 65 - 99 mg/dL    BUN 30 (H) 8 - 23 mg/dL    Creatinine 1.16 (H) 0.57 - 1.00 mg/dL    Sodium 148 (H) 136 - 145 mmol/L    Potassium 4.4 3.5 - 5.2 mmol/L    Chloride 110 (H) 98 - 107 mmol/L    CO2 22.7 22.0 - 29.0 mmol/L    Calcium 8.2 (L) 8.6 - 10.5 mg/dL    Total Protein 5.9 (L) 6.0 - 8.5 g/dL    Albumin 2.80 (L) 3.50 - 5.20 g/dL    ALT (SGPT) 11 1 - 33 U/L    AST (SGOT) 18 1 - 32 U/L    Alkaline Phosphatase 79 39 - 117 U/L    Total Bilirubin <0.2 0.1 - 1.2 mg/dL    eGFR  African Amer 55 (L) >60 mL/min/1.73    Globulin 3.1 gm/dL    A/G Ratio 0.9 g/dL    BUN/Creatinine Ratio 25.9 (H) 7.0 - 25.0    Anion Gap 15.3 mmol/L   Type & Screen    Collection Time: 04/10/18 10:06 PM   Result Value Ref Range    ABO Type B     RH type Positive     Antibody Screen Negative     T&S Expiration Date 4/13/2018 11:59:59 PM    CBC Auto Differential    Collection Time: 04/10/18 10:06 PM   Result Value Ref Range    WBC 13.14  (H) 4.50 - 10.70 10*3/mm3    RBC 1.77 (L) 3.90 - 5.20 10*6/mm3    Hemoglobin 4.6 (C) 11.9 - 15.5 g/dL    Hematocrit 15.8 (C) 35.6 - 45.5 %    MCV 89.3 80.5 - 98.2 fL    MCH 26.0 (L) 26.9 - 32.0 pg    MCHC 29.1 (L) 32.4 - 36.3 g/dL    RDW 17.8 (H) 11.7 - 13.0 %    RDW-SD 56.2 (H) 37.0 - 54.0 fl    MPV 11.1 6.0 - 12.0 fL    Platelets 403 140 - 500 10*3/mm3    Neutrophil % 79.5 (H) 42.7 - 76.0 %    Lymphocyte % 10.4 (L) 19.6 - 45.3 %    Monocyte % 7.9 5.0 - 12.0 %    Eosinophil % 1.2 0.3 - 6.2 %    Basophil % 0.2 0.0 - 1.5 %    Immature Grans % 0.8 (H) 0.0 - 0.5 %    Neutrophils, Absolute 10.44 (H) 1.90 - 8.10 10*3/mm3    Lymphocytes, Absolute 1.37 0.90 - 4.80 10*3/mm3    Monocytes, Absolute 1.04 0.20 - 1.20 10*3/mm3    Eosinophils, Absolute 0.16 0.00 - 0.70 10*3/mm3    Basophils, Absolute 0.03 0.00 - 0.20 10*3/mm3    Immature Grans, Absolute 0.10 (H) 0.00 - 0.03 10*3/mm3    nRBC 2.3 (H) 0.0 - 0.0 /100 WBC   Urinalysis With / Microscopic If Indicated - Urine, Clean Catch    Collection Time: 04/11/18 12:33 AM   Result Value Ref Range    Color, UA Yellow Yellow, Straw    Appearance, UA Cloudy (A) Clear    pH, UA 5.5 5.0 - 8.0    Specific Gravity, UA 1.014 1.005 - 1.030    Glucose, UA Negative Negative    Ketones, UA Negative Negative    Bilirubin, UA Negative Negative    Blood, UA Negative Negative    Protein, UA 30 mg/dL (1+) (A) Negative    Leuk Esterase, UA Small (1+) (A) Negative    Nitrite, UA Positive (A) Negative    Urobilinogen, UA 0.2 E.U./dL 0.2 - 1.0 E.U./dL   Urinalysis, Microscopic Only - Urine, Clean Catch    Collection Time: 04/11/18 12:33 AM   Result Value Ref Range    RBC, UA 3-5 (A) None Seen, 0-2 /HPF    WBC, UA 21-30 (A) None Seen, 0-2 /HPF    Bacteria, UA 4+ (A) None Seen /HPF    Squamous Epithelial Cells, UA 3-6 (A) None Seen, 0-2 /HPF    Hyaline Casts, UA 3-6 None Seen /LPF    Methodology Automated Microscopy    Prepare RBC, 2 Units    Collection Time: 04/11/18  2:34 AM   Result Value Ref Range     Product Code L6588A65     Unit Number [id number]     UNIT  ABO O     UNIT  RH POS     CROSSMATCH 1 INTERPRETATION Compatible     Dispense Status IS     Blood Type OPOS     Blood Expiration Date 201805042359     Blood Type Barcode 5100     Product Code R5697L34     Unit Number [id number]     UNIT  ABO O     UNIT  RH POS     CROSSMATCH 1 INTERPRETATION Compatible     Dispense Status IS     Blood Type OPOS     Blood Expiration Date 201805042359     Blood Type Barcode 5100    Basic Metabolic Panel    Collection Time: 04/11/18  5:43 AM   Result Value Ref Range    Glucose 100 (H) 65 - 99 mg/dL    BUN 24 (H) 8 - 23 mg/dL    Creatinine 0.98 0.57 - 1.00 mg/dL    Sodium 141 136 - 145 mmol/L    Potassium 4.3 3.5 - 5.2 mmol/L    Chloride 108 (H) 98 - 107 mmol/L    CO2 23.4 22.0 - 29.0 mmol/L    Calcium 7.8 (L) 8.6 - 10.5 mg/dL    eGFR  African Amer 67 >60 mL/min/1.73    BUN/Creatinine Ratio 24.5 7.0 - 25.0    Anion Gap 9.6 mmol/L   CBC Auto Differential    Collection Time: 04/11/18  5:43 AM   Result Value Ref Range    WBC 8.72 4.50 - 10.70 10*3/mm3    RBC 2.35 (L) 3.90 - 5.20 10*6/mm3    Hemoglobin 6.4 (C) 11.9 - 15.5 g/dL    Hematocrit 20.7 (C) 35.6 - 45.5 %    MCV 88.1 80.5 - 98.2 fL    MCH 27.2 26.9 - 32.0 pg    MCHC 30.9 (L) 32.4 - 36.3 g/dL    RDW 15.9 (H) 11.7 - 13.0 %    RDW-SD 50.4 37.0 - 54.0 fl    MPV 11.0 6.0 - 12.0 fL    Platelets 261 140 - 500 10*3/mm3    Neutrophil % 75.3 42.7 - 76.0 %    Lymphocyte % 13.9 (L) 19.6 - 45.3 %    Monocyte % 8.5 5.0 - 12.0 %    Eosinophil % 1.1 0.3 - 6.2 %    Basophil % 0.3 0.0 - 1.5 %    Immature Grans % 0.9 (H) 0.0 - 0.5 %    Neutrophils, Absolute 6.56 1.90 - 8.10 10*3/mm3    Lymphocytes, Absolute 1.21 0.90 - 4.80 10*3/mm3    Monocytes, Absolute 0.74 0.20 - 1.20 10*3/mm3    Eosinophils, Absolute 0.10 0.00 - 0.70 10*3/mm3    Basophils, Absolute 0.03 0.00 - 0.20 10*3/mm3    Immature Grans, Absolute 0.08 (H) 0.00 - 0.03 10*3/mm3    nRBC 2.8 (H) 0.0 - 0.0 /100 WBC   POC  Glucose Once    Collection Time: 04/11/18  5:45 AM   Result Value Ref Range    Glucose 118 70 - 130 mg/dL   Hemoglobin & Hematocrit, Blood    Collection Time: 04/11/18 11:16 AM   Result Value Ref Range    Hemoglobin 7.0 (L) 11.9 - 15.5 g/dL    Hematocrit 21.1 (L) 35.6 - 45.5 %       I ordered the above labs and reviewed the results    RADIOLOGY  CT Abdomen Pelvis With Contrast   Final Result   IMPRESSION :    1. 2.4 cm low density splenic lesion. Favor benign etiology but   recommend follow-up to ensure stability.   2. Tiny left renal lesions as discussed.   3. Extensive diverticulosis           This report was finalized on 4/11/2018 12:04 AM by Emery Abbott MD.              I ordered the above noted radiological studies and reviewed the images on the PACS system.     MEDICATIONS GIVEN IN ER  Medications   sodium chloride 0.9 % flush 10 mL (not administered)   dextrose (GLUTOSE) oral gel 15 g (not administered)   dextrose (D50W) solution 25 g (not administered)   glucagon (human recombinant) (GLUCAGEN DIAGNOSTIC) injection 1 mg (not administered)   insulin aspart (novoLOG) injection 0-7 Units (0 Units Subcutaneous Not Given 4/11/18 1200)   atenolol (TENORMIN) tablet 100 mg (100 mg Oral Given 4/11/18 1034)   hydrALAZINE (APRESOLINE) tablet 100 mg (100 mg Oral Given 4/11/18 1034)   atorvastatin (LIPITOR) tablet 20 mg (not administered)   allopurinol (ZYLOPRIM) tablet 300 mg (300 mg Oral Given 4/11/18 1034)   levothyroxine (SYNTHROID, LEVOTHROID) tablet 200 mcg (200 mcg Oral Given 4/11/18 7052)   potassium chloride (MICRO-K) CR capsule 20 mEq (20 mEq Oral Given 4/11/18 1034)   metOLazone (ZAROXOLYN) tablet 2.5 mg (2.5 mg Oral Given 4/11/18 1035)   HYDROcodone-acetaminophen (NORCO)  MG per tablet 1 tablet (not administered)   sodium chloride 0.9 % bolus 1,000 mL (1,000 mL Intravenous New Bag 4/10/18 9602)   iopamidol (ISOVUE-300) 61 % injection 100 mL (85 mL Intravenous Given 4/10/18 3081)   morphine injection 2  mg (2 mg Intravenous Given 4/11/18 0020)   ondansetron (ZOFRAN) injection 4 mg (4 mg Intravenous Given 4/11/18 0020)   furosemide (LASIX) injection 20 mg (20 mg Intravenous Given 4/11/18 1035)   magnesium citrate solution 888 mL (888 mL Oral Given 4/11/18 1300)       EKG  Interpreted by ED Physician    PROCEDURES  Critical Care  Performed by: AMY SCHULTZ  Authorized by: TIANA REESE     Critical care provider statement:     Critical care time (minutes):  30    Critical care time was exclusive of:  Separately billable procedures and treating other patients    Critical care was necessary to treat or prevent imminent or life-threatening deterioration of the following conditions:  Circulatory failure    Critical care was time spent personally by me on the following activities:  Development of treatment plan with patient or surrogate, discussions with consultants, discussions with primary provider, examination of patient, ordering and performing treatments and interventions, ordering and review of laboratory studies, ordering and review of radiographic studies, re-evaluation of patient's condition and review of old charts          COURSE & MEDICAL DECISION MAKING  Pertinent Labs and Imaging studies that were ordered and reviewed are noted above.  Results were reviewed/discussed with the patient and they were also made aware of online assess.  Pt also made aware that some labs, such as cultures, will not be resulted during ER visit and follow up with PMD is necessary.     PROGRESS AND CONSULTS    Progress Notes:    ED Course     2200- Initial pt check. I endorsed that we will recheck pt Hgb as I do not believe her vital signs match with a Hgb that low. We will f/u with results.    2259- Reviewed pt's history and workup with Dr. Reese.  After a bedside evaluation; Dr Reese agrees with the plan of care    2311- Pt recheck. I advised pt her Hgb is a 4.8 on recheck. Thus she will need to be admitted. I have  "already ordered blood for pt. In addition, I will order repeat imaging on pt as this is necessary even though she has these last week. I will order her pain medication as well.    0006- Placed call to [facility] for pt admit.    0010- Received a call from Dr. Allred and discussed pt's case. Dr. Allred agreed with plan to admit pt to Dr. Olson.    0011- Based on the patient's lab findings and presenting symptoms, the doctor and I feel it is appropriate to admit the patient for further management, evaluation, and treatment.  I have discussed this with the admitting team.  I have also discussed this with the patient/family.  They are in agreement with admission.        Disposition vitals:  /57 (BP Location: Right arm, Patient Position: Lying)   Pulse 64   Temp 98.6 °F (37 °C) (Oral)   Resp 17   Ht 157.5 cm (62\")   Wt 99.8 kg (220 lb)   SpO2 95%   BMI 40.24 kg/m²       DIAGNOSIS  Final diagnoses:   Acute blood loss anemia   Diverticulosis of intestine with bleeding, unspecified intestinal tract location           Documentation assistance provided by amy Wolf for TAMIKO Dela Cruz.  Information recorded by the amy was done at my direction and has been verified and validated by me.  Electronically signed by Jesus Wolf on 4/10/2018 at time 3:37 PM       Jesus Wolf  04/11/18 0018            TAMIKO Buck  04/11/18 1537    "

## 2018-04-11 NOTE — ED NOTES
Called report to unit, room is still being cleaned.      Jazlyn Bruner RN  04/11/18 0100       Jazlyn Bruner RN  04/11/18 0101

## 2018-04-11 NOTE — PLAN OF CARE
Problem: Patient Care Overview  Goal: Plan of Care Review  Outcome: Ongoing (interventions implemented as appropriate)   04/11/18 0421   Coping/Psychosocial   Plan of Care Reviewed With patient   Plan of Care Review   Progress no change   OTHER   Outcome Summary vitals stable. pt denies pain. 2 units of PRBCs transfused overnight. pt is NPO except meds for a possible procedure today. will continue to monitor.        Problem: Anemia (Adult)  Goal: Identify Related Risk Factors and Signs and Symptoms  Outcome: Ongoing (interventions implemented as appropriate)    Goal: Symptom Improvement  Outcome: Ongoing (interventions implemented as appropriate)      Problem: Fall Risk (Adult)  Goal: Identify Related Risk Factors and Signs and Symptoms  Outcome: Ongoing (interventions implemented as appropriate)    Goal: Absence of Fall  Outcome: Ongoing (interventions implemented as appropriate)      Problem: Mobility, Physical Impaired (Adult)  Goal: Identify Related Risk Factors and Signs and Symptoms  Outcome: Ongoing (interventions implemented as appropriate)    Goal: Enhanced Mobility Skills  Outcome: Ongoing (interventions implemented as appropriate)    Goal: Enhanced Functional Ability  Outcome: Ongoing (interventions implemented as appropriate)

## 2018-04-11 NOTE — PROGRESS NOTES
Discharge Planning Assessment  [facility]     Patient Name: Saira Boateng  MRN: [id number]  Today's Date: 4/11/2018    Admit Date: 4/10/2018          Discharge Needs Assessment     Row Name 04/11/18 1134       Living Environment    Lives With spouse    Current Living Arrangements home/apartment/condo    Primary Care Provided by self    Family Caregiver if Needed spouse    Able to Return to Prior Arrangements yes       Resource/Environmental Concerns    Resource/Environmental Concerns none       Transition Planning    Patient/Family Anticipates Transition to home with family    Patient/Family Anticipated Services at Transition none    Transportation Anticipated family or friend will provide       Discharge Needs Assessment    Concerns to be Addressed no discharge needs identified    Equipment Currently Used at Home cane, straight    Anticipated Changes Related to Illness none            Discharge Plan     Row Name 04/11/18 1135       Plan    Plan Return home with     Patient/Family in Agreement with Plan yes    Plan Comments Spoke with patient at bedside.  Patient lives with  Roberto [phone], is IADL, uses a cane for steps, has never used HH or been to SNF.  Patient plans to return home at DC and does not anticiapte any DC needs.  CCP will follow as needed. BHumeniuk RN        Destination     No service coordination in this encounter.      Durable Medical Equipment     No service coordination in this encounter.      Dialysis/Infusion     No service coordination in this encounter.      Home Medical Care     No service coordination in this encounter.      Social Care     No service coordination in this encounter.                Demographic Summary     Row Name 04/11/18 1134       General Information    Admission Type inpatient    Arrived From home    Referral Source admission list    Reason for Consult discharge planning    Preferred Language English     Used During This  Interaction no            Functional Status     Row Name 04/11/18 1134       Functional Status    Usual Activity Tolerance moderate    Current Activity Tolerance moderate       Functional Status, IADL    Medications independent    Meal Preparation independent    Housekeeping independent    Laundry independent    Shopping independent       Mental Status    General Appearance WDL WDL       Mental Status Summary    Recent Changes in Mental Status/Cognitive Functioning no changes            Psychosocial    No documentation.           Abuse/Neglect    No documentation.           Legal    No documentation.           Substance Abuse    No documentation.           Patient Forms    No documentation.         Becky S. Humeniuk, RN

## 2018-04-11 NOTE — ED NOTES
Called for MR from [facility]. They will fax to POD B fax asap.      Edda Estrada  04/11/18 0022

## 2018-04-12 ENCOUNTER — ANESTHESIA (OUTPATIENT)
Dept: GASTROENTEROLOGY | Facility: HOSPITAL | Age: 73
End: 2018-04-12

## 2018-04-12 ENCOUNTER — ANESTHESIA EVENT (OUTPATIENT)
Dept: GASTROENTEROLOGY | Facility: HOSPITAL | Age: 73
End: 2018-04-12

## 2018-04-12 ENCOUNTER — INPATIENT HOSPITAL (OUTPATIENT)
Dept: URBAN - METROPOLITAN AREA HOSPITAL 113 | Facility: HOSPITAL | Age: 73
End: 2018-04-12
Payer: OTHER GOVERNMENT

## 2018-04-12 DIAGNOSIS — K92.1 MELENA: ICD-10-CM

## 2018-04-12 DIAGNOSIS — K44.9 DIAPHRAGMATIC HERNIA WITHOUT OBSTRUCTION OR GANGRENE: ICD-10-CM

## 2018-04-12 DIAGNOSIS — K29.50 UNSPECIFIED CHRONIC GASTRITIS WITHOUT BLEEDING: ICD-10-CM

## 2018-04-12 DIAGNOSIS — K92.2 GASTROINTESTINAL HEMORRHAGE, UNSPECIFIED: ICD-10-CM

## 2018-04-12 DIAGNOSIS — K57.30 DIVERTICULOSIS OF LARGE INTESTINE WITHOUT PERFORATION OR ABS: ICD-10-CM

## 2018-04-12 DIAGNOSIS — D62 ACUTE POSTHEMORRHAGIC ANEMIA: ICD-10-CM

## 2018-04-12 LAB
ANION GAP SERPL CALCULATED.3IONS-SCNC: 11.8 MMOL/L
BUN BLD-MCNC: 18 MG/DL (ref 8–23)
BUN/CREAT SERPL: 18.9 (ref 7–25)
CALCIUM SPEC-SCNC: 8.7 MG/DL (ref 8.6–10.5)
CHLORIDE SERPL-SCNC: 109 MMOL/L (ref 98–107)
CO2 SERPL-SCNC: 23.2 MMOL/L (ref 22–29)
CREAT BLD-MCNC: 0.95 MG/DL (ref 0.57–1)
GFR SERPL CREATININE-BSD FRML MDRD: 70 ML/MIN/1.73
GLUCOSE BLD-MCNC: 129 MG/DL (ref 65–99)
GLUCOSE BLDC GLUCOMTR-MCNC: 121 MG/DL (ref 70–130)
GLUCOSE BLDC GLUCOMTR-MCNC: 121 MG/DL (ref 70–130)
GLUCOSE BLDC GLUCOMTR-MCNC: 127 MG/DL (ref 70–130)
GLUCOSE BLDC GLUCOMTR-MCNC: 143 MG/DL (ref 70–130)
GLUCOSE BLDC GLUCOMTR-MCNC: 147 MG/DL (ref 70–130)
GLUCOSE BLDC GLUCOMTR-MCNC: 174 MG/DL (ref 70–130)
HCT VFR BLD AUTO: 24.6 % (ref 35.6–45.5)
HGB BLD-MCNC: 7.1 G/DL (ref 11.9–15.5)
POTASSIUM BLD-SCNC: 4 MMOL/L (ref 3.5–5.2)
SODIUM BLD-SCNC: 144 MMOL/L (ref 136–145)

## 2018-04-12 PROCEDURE — 99222 1ST HOSP IP/OBS MODERATE 55: CPT | Performed by: SURGERY

## 2018-04-12 PROCEDURE — 45378 DIAGNOSTIC COLONOSCOPY: CPT | Performed by: INTERNAL MEDICINE

## 2018-04-12 PROCEDURE — 85014 HEMATOCRIT: CPT | Performed by: INTERNAL MEDICINE

## 2018-04-12 PROCEDURE — 85018 HEMOGLOBIN: CPT | Performed by: INTERNAL MEDICINE

## 2018-04-12 PROCEDURE — 80048 BASIC METABOLIC PNL TOTAL CA: CPT | Performed by: INTERNAL MEDICINE

## 2018-04-12 PROCEDURE — P9016 RBC LEUKOCYTES REDUCED: HCPCS

## 2018-04-12 PROCEDURE — 44361 SMALL BOWEL ENDOSCOPY/BIOPSY: CPT | Performed by: INTERNAL MEDICINE

## 2018-04-12 PROCEDURE — 0DJD8ZZ INSPECTION OF LOWER INTESTINAL TRACT, VIA NATURAL OR ARTIFICIAL OPENING ENDOSCOPIC: ICD-10-PCS | Performed by: INTERNAL MEDICINE

## 2018-04-12 PROCEDURE — 36430 TRANSFUSION BLD/BLD COMPNT: CPT

## 2018-04-12 PROCEDURE — 86900 BLOOD TYPING SEROLOGIC ABO: CPT

## 2018-04-12 PROCEDURE — 25010000002 PROPOFOL 10 MG/ML EMULSION: Performed by: NURSE ANESTHETIST, CERTIFIED REGISTERED

## 2018-04-12 PROCEDURE — 0DB68ZX EXCISION OF STOMACH, VIA NATURAL OR ARTIFICIAL OPENING ENDOSCOPIC, DIAGNOSTIC: ICD-10-PCS | Performed by: INTERNAL MEDICINE

## 2018-04-12 PROCEDURE — 88312 SPECIAL STAINS GROUP 1: CPT | Performed by: INTERNAL MEDICINE

## 2018-04-12 PROCEDURE — 88305 TISSUE EXAM BY PATHOLOGIST: CPT | Performed by: INTERNAL MEDICINE

## 2018-04-12 PROCEDURE — 82962 GLUCOSE BLOOD TEST: CPT

## 2018-04-12 RX ORDER — LIDOCAINE HYDROCHLORIDE 20 MG/ML
INJECTION, SOLUTION INFILTRATION; PERINEURAL AS NEEDED
Status: DISCONTINUED | OUTPATIENT
Start: 2018-04-12 | End: 2018-04-12 | Stop reason: SURG

## 2018-04-12 RX ORDER — SODIUM CHLORIDE, SODIUM LACTATE, POTASSIUM CHLORIDE, CALCIUM CHLORIDE 600; 310; 30; 20 MG/100ML; MG/100ML; MG/100ML; MG/100ML
30 INJECTION, SOLUTION INTRAVENOUS CONTINUOUS PRN
Status: DISCONTINUED | OUTPATIENT
Start: 2018-04-12 | End: 2018-04-18

## 2018-04-12 RX ORDER — PROPOFOL 10 MG/ML
VIAL (ML) INTRAVENOUS CONTINUOUS PRN
Status: DISCONTINUED | OUTPATIENT
Start: 2018-04-12 | End: 2018-04-12 | Stop reason: SURG

## 2018-04-12 RX ADMIN — ATENOLOL 100 MG: 50 TABLET ORAL at 08:16

## 2018-04-12 RX ADMIN — LEVOTHYROXINE SODIUM 200 MCG: 100 TABLET ORAL at 05:58

## 2018-04-12 RX ADMIN — HYDROCODONE BITARTRATE AND ACETAMINOPHEN 1 TABLET: 10; 325 TABLET ORAL at 20:44

## 2018-04-12 RX ADMIN — POTASSIUM CHLORIDE 20 MEQ: 750 CAPSULE, EXTENDED RELEASE ORAL at 08:16

## 2018-04-12 RX ADMIN — ATORVASTATIN CALCIUM 20 MG: 20 TABLET, FILM COATED ORAL at 20:44

## 2018-04-12 RX ADMIN — POTASSIUM CHLORIDE 20 MEQ: 750 CAPSULE, EXTENDED RELEASE ORAL at 20:44

## 2018-04-12 RX ADMIN — HYDRALAZINE HYDROCHLORIDE 100 MG: 50 TABLET, FILM COATED ORAL at 08:16

## 2018-04-12 RX ADMIN — ALLOPURINOL 300 MG: 300 TABLET ORAL at 08:17

## 2018-04-12 RX ADMIN — METOLAZONE 2.5 MG: 2.5 TABLET ORAL at 08:16

## 2018-04-12 RX ADMIN — HYDRALAZINE HYDROCHLORIDE 100 MG: 50 TABLET, FILM COATED ORAL at 20:44

## 2018-04-12 RX ADMIN — SODIUM CHLORIDE, POTASSIUM CHLORIDE, SODIUM LACTATE AND CALCIUM CHLORIDE 30 ML/HR: 600; 310; 30; 20 INJECTION, SOLUTION INTRAVENOUS at 11:59

## 2018-04-12 RX ADMIN — EPHEDRINE SULFATE 10 MG: 50 INJECTION INTRAMUSCULAR; INTRAVENOUS; SUBCUTANEOUS at 12:26

## 2018-04-12 RX ADMIN — HYDROCODONE BITARTRATE AND ACETAMINOPHEN 1 TABLET: 10; 325 TABLET ORAL at 04:50

## 2018-04-12 RX ADMIN — LIDOCAINE HYDROCHLORIDE 60 MG: 20 INJECTION, SOLUTION INFILTRATION; PERINEURAL at 12:11

## 2018-04-12 RX ADMIN — PROPOFOL 300 MCG/KG/MIN: 10 INJECTION, EMULSION INTRAVENOUS at 12:11

## 2018-04-12 NOTE — PROGRESS NOTES
"DAILY PROGRESS NOTE  [facility]    Patient Identification:  Name: Saira Boateng  Age: 73 y.o.  Sex: female  :  1945  MRN: [id number]         Primary Care Physician: Ankita Martin MD    Subjective:just had endoscopies. Had more bleeding after procedure.  Interval History:  Admitted 4/10/18 with profound anemia due to GI bleed. Transfused 2 units PRBCs. Had EGD and C-scope today. During prep last night was noted to have blood/stool from both  rectum and vagina. Pt reports more blood in stool since procedures.    Objective:    Scheduled Meds:  allopurinol 300 mg Oral Daily   atenolol 100 mg Oral Q24H   atorvastatin 20 mg Oral Nightly   hydrALAZINE 100 mg Oral Q12H   insulin aspart 0-7 Units Subcutaneous Q6H   levothyroxine 200 mcg Oral Q AM   metOLazone 2.5 mg Oral Daily   potassium chloride 20 mEq Oral BID     Continuous Infusions:  lactated ringers 30 mL/hr Last Rate: Stopped (18 1337)       Vital signs in last 24 hours:  Temp:  [97.7 °F (36.5 °C)-98.6 °F (37 °C)] 97.8 °F (36.6 °C)  Heart Rate:  [61-82] 70  Resp:  [16-18] 16  BP: (103-163)/(56-75) 163/74    Intake/Output:    Intake/Output Summary (Last 24 hours) at 18 1344  Last data filed at 18 1337   Gross per 24 hour   Intake              980 ml   Output                0 ml   Net              980 ml       Exam:  /74 (BP Location: Left arm, Patient Position: Lying)   Pulse 70   Temp 97.8 °F (36.6 °C) (Oral)   Resp 16   Ht 157.5 cm (62\")   Wt 99.8 kg (220 lb)   SpO2 95%   BMI 40.24 kg/m²     General Appearance:    Alert, cooperative, no distress, appears stated age   Head:    Normocephalic, without obvious abnormality, atraumatic   Eyes:    PERRL, conjunctiva/corneas clear, EOM's intact, both eyes   Ears:    Normal external ear canals, both ears   Nose:   Nares normal, septum midline, mucosa normal, no drainage    or sinus tenderness   Throat:   Lips, mucosa, and tongue normal   Neck:   Supple, " symmetrical, trachea midline, no adenopathy;     thyroid:  no enlargement/tenderness/nodules; no carotid    bruit or JVD   Back:     no CVA tenderness   Lungs:     Clear to auscultation, decreased at bases bilaterally, respirations unlabored   Chest Wall:    No tenderness or deformity    Heart:    Regular rate and rhythm, S1 and S2 normal, no murmur, rub   or gallop   Abdomen:     Obese. Soft, non-tender, bowel sounds active all four quadrants,  no masses, no hepatomegaly, no splenomegaly   Extremities:   Extremities normal, atraumatic, no cyanosis. LE edema L>R   Pulses:   2+ and symmetric all extremities   Skin:   Skin color, texture, turgor normal, no rashes or lesions   Lymph nodes:   Cervical, supraclavicular, and axillary nodes normal   Neurologic:   CNII-XII grossly intact, normal strength, sensation grossly intact throughout      .       Data Review:  Lab Results   Component Value Date    WBC 8.72 04/11/2018    HGB 7.0 (L) 04/11/2018    HCT 21.1 (L) 04/11/2018     04/11/2018     Lab Results   Component Value Date     04/12/2018    K 4.0 04/12/2018     (H) 04/12/2018    CO2 23.2 04/12/2018    BUN 18 04/12/2018    CREATININE 0.95 04/12/2018    GLUCOSE 129 (H) 04/12/2018     Lab Results   Component Value Date    CALCIUM 8.7 04/12/2018     Lab Results   Component Value Date    AST 18 04/10/2018    ALT 11 04/10/2018    ALKPHOS 79 04/10/2018     No results found for: APTT, INR  Lab Results   Component Value Date    COLORU Yellow 04/11/2018    CLARITYU Cloudy (A) 04/11/2018    PHUR 5.5 04/11/2018    GLUCOSEU Negative 04/11/2018    KETONESU Negative 04/11/2018    BLOODU Negative 04/11/2018    LEUKOCYTESUR Small (1+) (A) 04/11/2018    BILIRUBINUR Negative 04/11/2018    UROBILINOGEN 0.2 E.U./dL 04/11/2018    RBCUA 3-5 (A) 04/11/2018    WBCUA 21-30 (A) 04/11/2018    BACTERIA 4+ (A) 04/11/2018     No results found for: TROPONINT, TROPONINI, BNP  No components found for: HGBA1C;2  No components found  for: TSH;2  Patient Active Problem List   Diagnosis Code   • Acute blood loss anemia D62   • Diverticulosis of intestine with bleeding K57.91       Assessment:  Active Problems:    Acute blood loss anemia    Diverticulosis of intestine with bleeding    Anemia    HTN    DM    Recto-vaginal fistula    Plan:  Cont symptom management  Monitor H/H closely  Transfuse as needed  Surgery consult- pt requests Dr Baldwin  Gyn consult    Guera Bishop MD  4/12/2018  1:44 PM

## 2018-04-12 NOTE — ANESTHESIA POSTPROCEDURE EVALUATION
Patient: Saira Boateng    Procedure Summary     Date:  04/12/18 Room / Location:   [facility]    Anesthesia Start:  1208 Anesthesia Stop:  1306    Procedures:       ENTEROSCOPY SMALL BOWEL WITH BIOPSY (N/A Esophagus)      COLONOSCOPY TO CECUM (N/A ) Diagnosis:       Acute blood loss anemia      Diverticulosis of intestine with bleeding, unspecified intestinal tract location      (Acute blood loss anemia [D62])      (Diverticulosis of intestine with bleeding, unspecified intestinal tract location [K57.91])    Surgeon:  Winston Esteves MD Provider:  Jake Vivas MD    Anesthesia Type:  MAC ASA Status:  3          Anesthesia Type: MAC  Last vitals  BP   163/74 (04/12/18 1336)   Temp   36.6 °C (97.8 °F) (04/12/18 1336)   Pulse   70 (04/12/18 1336)   Resp   16 (04/12/18 1336)     SpO2   95 % (04/12/18 1336)     Post Anesthesia Care and Evaluation    Patient location during evaluation: bedside  Patient participation: complete - patient participated  Level of consciousness: awake and alert  Pain score: 0  Pain management: adequate  Airway patency: patent  Anesthetic complications: No anesthetic complications  PONV Status: none  Cardiovascular status: acceptable  Respiratory status: acceptable  Hydration status: acceptable  Post Neuraxial Block status: Motor and sensory function returned to baseline

## 2018-04-12 NOTE — ANESTHESIA PREPROCEDURE EVALUATION
Anesthesia Evaluation     Patient summary reviewed and Nursing notes reviewed                Airway   Mallampati: II  TM distance: >3 FB  Neck ROM: full  no difficulty expected  Dental - normal exam   (+) upper dentures    Pulmonary - normal exam    breath sounds clear to auscultation  (+) asthma,   (-) decreased breath sounds, wheezes  Cardiovascular - normal exam    Rhythm: regular  Rate: normal    (+) hypertension, hyperlipidemia,       Neuro/Psych  GI/Hepatic/Renal/Endo    (+)   diabetes mellitus,     Musculoskeletal     Abdominal  - normal exam   Substance History      OB/GYN          Other   (+) arthritis                   Anesthesia Plan    ASA 3     MAC     Anesthetic plan and risks discussed with patient.

## 2018-04-12 NOTE — PLAN OF CARE
Problem: Patient Care Overview  Goal: Plan of Care Review  Outcome: Ongoing (interventions implemented as appropriate)   04/12/18 1981   Coping/Psychosocial   Plan of Care Reviewed With patient   Plan of Care Review   Progress no change   OTHER   Outcome Summary Patient had a colonoscopy and EGD today. Gi consulted general surgery to evaluate the erosion in the colon wall. VSS continue to monitor lab value, HGb 7.1 has been holding there since this AM befoe the surgery.        Problem: Anemia (Adult)  Goal: Identify Related Risk Factors and Signs and Symptoms  Outcome: Outcome(s) achieved Date Met: 04/12/18    Goal: Symptom Improvement  Outcome: Ongoing (interventions implemented as appropriate)      Problem: Fall Risk (Adult)  Goal: Absence of Fall  Outcome: Ongoing (interventions implemented as appropriate)      Problem: Skin Injury Risk (Adult)  Goal: Skin Health and Integrity  Outcome: Ongoing (interventions implemented as appropriate)

## 2018-04-12 NOTE — CONSULTS
Inpatient General Surgery Consult  Consult performed by: SUBHA REID JR  Consult ordered by: ROSALINE RICK          Patient Care Team:  Ankita Martin MD as PCP - General (Internal Medicine)    Chief complaint:  Lower GI bleed    Subjective     History of Present Illness     The patient is a very pleasant 73-year-old -American female who developed the acute onset of a lower GI bleed on 3/31/2018.  She states that she was in her normal state of health and at a casino when she developed the sudden urge to have a bowel movement and had significant rectal bleeding that was difficult to control.  It was bright red blood per rectum.  There was no abdominal pain.  She presented to the [facility] emergency room and was admitted for 5 days.  She received a total of 3 units of packed red blood cells and had an EGD and colonoscopy with the suspicion that she had a diverticular bleed.  She was ultimately discharged to home and had no more bright red blood per rectum.  She did pass melena with each bowel movement and developed progressively worsening shortness of breath.  She was seen by her primary care physician who checked labs and her hemoglobin was below 5.  She was advised to present to the emergency room where she was noted to have a hemoglobin of 4.6 and hematocrit of 15.8.  She was admitted and given 2 units of blood.  Her hemoglobin and hematocrit have been stable since that time with a current hemoglobin of 7.1 and hematocrit of 24.6.  She continues to have melena but no rectal bleeding.  She has not had any shortness of breath.  She underwent an enteroscopy and colonoscopy that showed mild gastritis and diverticulosis with old blood in the left colon but no evidence of active bleeding.  The patient is currently feeling well.    The patient also noted stool per vagina when she took the bowel prep for the colonoscopy.  Upon further questioning she states that she has had stool soilage from her vagina  over the past several months.  She has also passed flatus through her vagina that is more frequent and with more volume over the past several weeks.    Review of Systems   Constitutional: Positive for fatigue. Negative for activity change, appetite change and fever.   HENT: Negative for trouble swallowing and voice change.    Respiratory: Positive for shortness of breath. Negative for chest tightness.    Cardiovascular: Negative for chest pain and palpitations.   Gastrointestinal: Positive for blood in stool. Negative for abdominal pain, constipation, diarrhea, nausea and vomiting.   Endocrine: Negative for cold intolerance and heat intolerance.   Genitourinary: Negative for dysuria and flank pain.   Neurological: Negative for dizziness and light-headedness.   Hematological: Negative for adenopathy. Does not bruise/bleed easily.   Psychiatric/Behavioral: Negative for agitation and confusion.        Past Medical History:   Diagnosis Date   • Anemia    • Arthritis     gouty   • Asthma     ASTHMA ATTACK POST ANESTHESIA   • Diabetes mellitus    • Disease of thyroid gland    • GI bleed    • Hyperlipidemia    • Hypertension    • Injury of back     sciatica   ,   Past Surgical History:   Procedure Laterality Date   • ANKLE SURGERY Left    • CHOLECYSTECTOMY     • COLONOSCOPY N/A 4/12/2018    Procedure: COLONOSCOPY TO CECUM;  Surgeon: Winston Esteves MD;  Location: [facility] ENDOSCOPY;  Service: Gastroenterology   • ENTEROSCOPY SMALL BOWEL N/A 4/12/2018    Procedure: ENTEROSCOPY SMALL BOWEL WITH BIOPSY;  Surgeon: Winston Esteves MD;  Location: [facility] ENDOSCOPY;  Service: Gastroenterology   • HYSTERECTOMY     • JOINT REPLACEMENT      RIGHT SHOULDER REPLACEMENT   • NEPHRECTOMY Right    • THYROID SURGERY     ,   Family History   Problem Relation Age of Onset   • No Known Problems Mother    • No Known Problems Father    • No Known Problems Sister    • No Known Problems Brother    • No Known Problems Daughter    • No Known Problems  Son    • No Known Problems Maternal Grandmother    • No Known Problems Paternal Grandmother    • No Known Problems Maternal Aunt    • No Known Problems Paternal Aunt    • BRCA 1/2 Neg Hx    • Breast cancer Neg Hx    • Colon cancer Neg Hx    • Endometrial cancer Neg Hx    • Ovarian cancer Neg Hx    ,   Social History   Substance Use Topics   • Smoking status: Former Smoker   • Smokeless tobacco: Never Used      Comment: 31 yrs as of 2018   • Alcohol use Yes      Comment: wine social    and Allergies:  Review of patient's allergies indicates no known allergies.    Objective      Vital Signs  Temp:  [97.7 °F (36.5 °C)-98.4 °F (36.9 °C)] 98.2 °F (36.8 °C)  Heart Rate:  [61-82] 65  Resp:  [16-18] 16  BP: (103-163)/(56-75) 145/63    Physical Exam   Constitutional: She is oriented to person, place, and time. She appears well-developed and well-nourished.  Non-toxic appearance. No distress.   HENT:   Head: Normocephalic and atraumatic.   Eyes: EOM are normal. No scleral icterus. Right eye exhibits normal extraocular motion. Left eye exhibits normal extraocular motion.   Neck: Normal range of motion. Neck supple. No JVD present. No tracheal deviation present.   Cardiovascular: Normal rate and regular rhythm.    Pulmonary/Chest: Effort normal and breath sounds normal. No respiratory distress. She exhibits no tenderness.   Abdominal: Soft. Normal appearance and bowel sounds are normal. She exhibits no distension. There is no hepatosplenomegaly. There is no tenderness. No hernia.   Neurological: She is alert and oriented to person, place, and time.   Skin: Skin is warm and dry.   Psychiatric: She has a normal mood and affect. Her behavior is normal. Judgment and thought content normal.       Results Review:    I reviewed the patient's new clinical results.        Assessment/Plan     Active Problems:    Acute blood loss anemia    Diverticulosis of intestine with bleeding      Assessment & Plan     1.  GI bleed: The patient  likely had a lower GI bleed with her initial symptoms characterized by bright red blood per rectum that was uncontrollable but no associated abdominal pain.  She then had ongoing melena with progressively worsening anemia.  The exact location of her GI bleeding has not been determined making a surgical approach difficult.  At this point, she will be continued on conservative management but she will be given another 1 unit of packed red blood cells to try to provide a physiologic buffer in case she has a recurrent lower GI bleed.      2.  Rectovaginal fistula: The patient has clinical signs of a rectovaginal fistula.  This can be addressed electively as an outpatient.    I discussed the patients findings and my recommendations with patient    Darwin Baldwin Jr., MD  04/12/18  6:09 PM

## 2018-04-12 NOTE — PLAN OF CARE
Problem: Patient Care Overview  Goal: Plan of Care Review  Outcome: Ongoing (interventions implemented as appropriate)   04/12/18 0352   Coping/Psychosocial   Plan of Care Reviewed With patient   Plan of Care Review   Progress improving   OTHER   Outcome Summary vitals stable. pt expressed pain. meds given per orders. HGB is trending up. Pt has been NPO since midnight for an EGD and colonoscopy tomorrow. will continue to monitor.        Problem: Anemia (Adult)  Goal: Identify Related Risk Factors and Signs and Symptoms  Outcome: Ongoing (interventions implemented as appropriate)    Goal: Symptom Improvement  Outcome: Ongoing (interventions implemented as appropriate)      Problem: Fall Risk (Adult)  Goal: Identify Related Risk Factors and Signs and Symptoms  Outcome: Ongoing (interventions implemented as appropriate)    Goal: Absence of Fall  Outcome: Ongoing (interventions implemented as appropriate)      Problem: Mobility, Physical Impaired (Adult)  Goal: Identify Related Risk Factors and Signs and Symptoms  Outcome: Ongoing (interventions implemented as appropriate)    Goal: Enhanced Mobility Skills  Outcome: Ongoing (interventions implemented as appropriate)    Goal: Enhanced Functional Ability  Outcome: Ongoing (interventions implemented as appropriate)      Problem: Skin Injury Risk (Adult)  Goal: Identify Related Risk Factors and Signs and Symptoms  Outcome: Ongoing (interventions implemented as appropriate)    Goal: Skin Health and Integrity  Outcome: Ongoing (interventions implemented as appropriate)

## 2018-04-13 LAB
ABO + RH BLD: NORMAL
BH BB BLOOD EXPIRATION DATE: NORMAL
BH BB BLOOD TYPE BARCODE: 7300
BH BB DISPENSE STATUS: NORMAL
BH BB PRODUCT CODE: NORMAL
BH BB UNIT NUMBER: NORMAL
CYTO UR: NORMAL
GLUCOSE BLDC GLUCOMTR-MCNC: 115 MG/DL (ref 70–130)
GLUCOSE BLDC GLUCOMTR-MCNC: 118 MG/DL (ref 70–130)
GLUCOSE BLDC GLUCOMTR-MCNC: 130 MG/DL (ref 70–130)
GLUCOSE BLDC GLUCOMTR-MCNC: 152 MG/DL (ref 70–130)
HCT VFR BLD AUTO: 25.8 % (ref 35.6–45.5)
HGB BLD-MCNC: 7.8 G/DL (ref 11.9–15.5)
LAB AP CASE REPORT: NORMAL
Lab: NORMAL
PATH REPORT.FINAL DX SPEC: NORMAL
PATH REPORT.GROSS SPEC: NORMAL
UNIT  ABO: NORMAL
UNIT  RH: NORMAL

## 2018-04-13 PROCEDURE — 82962 GLUCOSE BLOOD TEST: CPT

## 2018-04-13 PROCEDURE — 99232 SBSQ HOSP IP/OBS MODERATE 35: CPT | Performed by: INTERNAL MEDICINE

## 2018-04-13 PROCEDURE — 85018 HEMOGLOBIN: CPT | Performed by: INTERNAL MEDICINE

## 2018-04-13 PROCEDURE — 99231 SBSQ HOSP IP/OBS SF/LOW 25: CPT | Performed by: SURGERY

## 2018-04-13 PROCEDURE — 85014 HEMATOCRIT: CPT | Performed by: INTERNAL MEDICINE

## 2018-04-13 RX ADMIN — POTASSIUM CHLORIDE 20 MEQ: 750 CAPSULE, EXTENDED RELEASE ORAL at 09:24

## 2018-04-13 RX ADMIN — LEVOTHYROXINE SODIUM 200 MCG: 100 TABLET ORAL at 06:23

## 2018-04-13 RX ADMIN — ATENOLOL 100 MG: 50 TABLET ORAL at 09:24

## 2018-04-13 RX ADMIN — ATORVASTATIN CALCIUM 20 MG: 20 TABLET, FILM COATED ORAL at 20:09

## 2018-04-13 RX ADMIN — HYDROCODONE BITARTRATE AND ACETAMINOPHEN 1 TABLET: 10; 325 TABLET ORAL at 22:55

## 2018-04-13 RX ADMIN — HYDRALAZINE HYDROCHLORIDE 100 MG: 50 TABLET, FILM COATED ORAL at 20:10

## 2018-04-13 RX ADMIN — HYDRALAZINE HYDROCHLORIDE 100 MG: 50 TABLET, FILM COATED ORAL at 09:24

## 2018-04-13 RX ADMIN — METOLAZONE 2.5 MG: 2.5 TABLET ORAL at 09:24

## 2018-04-13 RX ADMIN — ALLOPURINOL 300 MG: 300 TABLET ORAL at 09:24

## 2018-04-13 RX ADMIN — POTASSIUM CHLORIDE 20 MEQ: 750 CAPSULE, EXTENDED RELEASE ORAL at 20:10

## 2018-04-13 NOTE — PROGRESS NOTES
"DAILY PROGRESS NOTE  [facility]    Patient Identification:  Name: Saira Boateng  Age: 73 y.o.  Sex: female  :  1945  MRN: [id number]         Primary Care Physician: Ankita Martin MD    Subjective:passed additional clots today with bowel movement  Interval History:  Admitted 4/10/18 with profound anemia due to GI bleed. Transfused 2 units PRBCs. Had EGD and C-scope. During prep lwas noted to have blood/stool from both  rectum and vagina. Transfused again overnight.Pt reports continued blood in stool - large clots.     Objective:    Scheduled Meds:    allopurinol 300 mg Oral Daily   atenolol 100 mg Oral Q24H   atorvastatin 20 mg Oral Nightly   hydrALAZINE 100 mg Oral Q12H   insulin aspart 0-7 Units Subcutaneous Q6H   levothyroxine 200 mcg Oral Q AM   metOLazone 2.5 mg Oral Daily   potassium chloride 20 mEq Oral BID     Continuous Infusions:    lactated ringers 30 mL/hr Last Rate: Stopped (18 1337)       Vital signs in last 24 hours:  Temp:  [97.9 °F (36.6 °C)-98.4 °F (36.9 °C)] 97.9 °F (36.6 °C)  Heart Rate:  [62-83] 69  Resp:  [16-18] 18  BP: (101-151)/(52-66) 124/57    Intake/Output:    Intake/Output Summary (Last 24 hours) at 18 1357  Last data filed at 18 1315   Gross per 24 hour   Intake              925 ml   Output                0 ml   Net              925 ml       Exam:  /57 (BP Location: Right arm, Patient Position: Lying)   Pulse 69   Temp 97.9 °F (36.6 °C) (Oral)   Resp 18   Ht 157.5 cm (62\")   Wt 99.8 kg (220 lb)   SpO2 99%   BMI 40.24 kg/m²     General Appearance:    Alert, cooperative, no distress, appears stated age   Head:    Normocephalic, without obvious abnormality, atraumatic   Eyes:    PERRL, conjunctiva/corneas clear, EOM's intact, both eyes   Ears:    Normal external ear canals, both ears   Nose:   Nares normal, septum midline, mucosa normal, no drainage    or sinus tenderness   Throat:   Lips, mucosa, and tongue normal   Neck:   " Supple, symmetrical, trachea midline, no adenopathy;     thyroid:  no enlargement/tenderness/nodules; no carotid    bruit or JVD   Back:     no CVA tenderness   Lungs:     Clear to auscultation, decreased at bases bilaterally, respirations unlabored   Chest Wall:    No tenderness or deformity    Heart:    Regular rate and rhythm, S1 and S2 normal, no murmur, rub   or gallop   Abdomen:     Obese. Soft, non-tender, bowel sounds active all four quadrants,  no masses, no hepatomegaly, no splenomegaly   Extremities:   Extremities normal, atraumatic, no cyanosis. LE edema L>R   Pulses:   2+ and symmetric all extremities   Skin:   Skin color, texture, turgor normal, no rashes or lesions   Lymph nodes:   Cervical, supraclavicular, and axillary nodes normal   Neurologic:   CNII-XII grossly intact, normal strength, sensation grossly intact throughout      .       Data Review:  Lab Results   Component Value Date    WBC 8.72 04/11/2018    HGB 7.1 (L) 04/12/2018    HCT 24.6 (L) 04/12/2018     04/11/2018     Lab Results   Component Value Date     04/12/2018    K 4.0 04/12/2018     (H) 04/12/2018    CO2 23.2 04/12/2018    BUN 18 04/12/2018    CREATININE 0.95 04/12/2018    GLUCOSE 129 (H) 04/12/2018     Lab Results   Component Value Date    CALCIUM 8.7 04/12/2018     Lab Results   Component Value Date    AST 18 04/10/2018    ALT 11 04/10/2018    ALKPHOS 79 04/10/2018     No results found for: APTT, INR  Lab Results   Component Value Date    COLORU Yellow 04/11/2018    CLARITYU Cloudy (A) 04/11/2018    PHUR 5.5 04/11/2018    GLUCOSEU Negative 04/11/2018    KETONESU Negative 04/11/2018    BLOODU Negative 04/11/2018    LEUKOCYTESUR Small (1+) (A) 04/11/2018    BILIRUBINUR Negative 04/11/2018    UROBILINOGEN 0.2 E.U./dL 04/11/2018    RBCUA 3-5 (A) 04/11/2018    WBCUA 21-30 (A) 04/11/2018    BACTERIA 4+ (A) 04/11/2018     No results found for: TROPONINT, TROPONINI, BNP  No components found for: HGBA1C;2  No components  found for: TSH;2  Patient Active Problem List   Diagnosis Code   • Acute blood loss anemia D62   • Diverticulosis of intestine with bleeding K57.91       Assessment:  Active Problems:    Acute blood loss anemia    Diverticulosis of intestine with bleeding    Anemia    HTN    DM    Recto-vaginal fistula    Plan:  Cont symptom management  Monitor H/H closely  Transfuse as needed  Follow Surgery beka Bishop MD  4/13/2018  1:57 PM

## 2018-04-13 NOTE — PROGRESS NOTES
"   LOS: 2 days   Patient Care Team:  Ankita Martin MD as PCP - General (Internal Medicine)    Chief Complaint: bleeding    Subjective     History of Present Illness  Patient has had further passage of new blood clots and marroon stool since colonoscopy .  Subjective:  Symptoms:  Stable.  She reports weakness.    Diet:  Adequate intake.    Activity level: Impaired due to weakness.    Pain:  She reports no pain.        History taken from: patient chart family    Objective     Vital Signs  Temp:  [97.9 °F (36.6 °C)-98.4 °F (36.9 °C)] 97.9 °F (36.6 °C)  Heart Rate:  [62-83] 69  Resp:  [16-18] 18  BP: (101-151)/(52-66) 124/57    Objective:  General Appearance:  Comfortable.    Vital signs: (most recent): Blood pressure 124/57, pulse 69, temperature 97.9 °F (36.6 °C), temperature source Oral, resp. rate 18, height 157.5 cm (62\"), weight 99.8 kg (220 lb), SpO2 99 %.  Vital signs are normal.    HEENT: Normal HEENT exam.    Lungs:  Normal effort.    Heart: Normal rate.    Abdomen: Abdomen is soft.  Bowel sounds are normal.     Skin:  Warm.              Results Review:     I reviewed the patient's new clinical results.  Discussed with DR REID    Medication Review: DONE    Assessment/Plan     Active Problems:    Acute blood loss anemia    Diverticulosis of intestine with bleeding      Assessment:  (Gastrointestinal bleeding  Workup so far notes enteroscopy to mid jejunum that is negative, a colonoscopy noted brown liquid stool in right colon,  Blood and clots in left colon,  The clots were slightly adherent and removed,   I strongly suspect this is a diverticular bleed given those findings. ).     Plan:   (Reviewed with patient,  and Dr Reid, whose input is greatly appreciated.  I reviewed with patient and  nature of diverticular bleeding  And while the culprit diverticulum was not identified, given the findings of brown stool in right colon and blood in left colon on exam I feel strongly this still is likely " a diverticular bleed.    I will defer to Dr Baldwin  Any surgical intervention.  If she develops torrential hemorhage would could consider bleeding scan and transfer to a facility that does angiography., which may or may not change ultimate treatment of her condition    I  Would continue further observation.).       Winston Esteves MD  04/13/18  4:21 PM    Time: Critical care 25 min

## 2018-04-13 NOTE — PLAN OF CARE
Problem: Patient Care Overview  Goal: Plan of Care Review  Outcome: Ongoing (interventions implemented as appropriate)   04/13/18 1603   Coping/Psychosocial   Plan of Care Reviewed With patient   Plan of Care Review   Progress no change   OTHER   Outcome Summary VSS, Hgb is at 7.8, I made Dr. alcantar aware and she did not want transfuse at this point. We will recheck labs in the AM. patient stated that she has had 2 bowel movements with dark colored stool. Continue to monitor for any s/s   04/13/18 1603   Coping/Psychosocial   Plan of Care Reviewed With patient   Plan of Care Review   Progress no change   OTHER   Outcome Summary VSS, Hgb is at 7.8, I made Dr. alcantar aware and she did not want transfuse at this point. We will recheck labs in the AM. patient stated that she has had 2 bowel movements with dark colored stool. Continue to monitor for any s/s of bleeding.     of bleeding.        Problem: Anemia (Adult)  Goal: Symptom Improvement  Outcome: Ongoing (interventions implemented as appropriate)      Problem: Fall Risk (Adult)  Goal: Absence of Fall  Outcome: Ongoing (interventions implemented as appropriate)      Problem: Skin Injury Risk (Adult)  Goal: Identify Related Risk Factors and Signs and Symptoms  Outcome: Ongoing (interventions implemented as appropriate)    Goal: Skin Health and Integrity  Outcome: Ongoing (interventions implemented as appropriate)

## 2018-04-13 NOTE — PLAN OF CARE
Problem: Patient Care Overview  Goal: Plan of Care Review  Outcome: Ongoing (interventions implemented as appropriate)   04/13/18 0432   Coping/Psychosocial   Plan of Care Reviewed With patient   Plan of Care Review   Progress no change   OTHER   Outcome Summary VSS pt transfused 1 unit PRBCs, tolerated well. Medicated for pain in right hip. Skin intact. No bloody stools this shift. Will continue to monitor. 04/13/18       Problem: Anemia (Adult)  Goal: Symptom Improvement  Outcome: Ongoing (interventions implemented as appropriate)      Problem: Fall Risk (Adult)  Goal: Identify Related Risk Factors and Signs and Symptoms  Outcome: Ongoing (interventions implemented as appropriate)    Goal: Absence of Fall  Outcome: Ongoing (interventions implemented as appropriate)      Problem: Mobility, Physical Impaired (Adult)  Goal: Identify Related Risk Factors and Signs and Symptoms  Outcome: Ongoing (interventions implemented as appropriate)    Goal: Enhanced Mobility Skills  Outcome: Ongoing (interventions implemented as appropriate)    Goal: Enhanced Functional Ability  Outcome: Ongoing (interventions implemented as appropriate)      Problem: Skin Injury Risk (Adult)  Goal: Identify Related Risk Factors and Signs and Symptoms  Outcome: Ongoing (interventions implemented as appropriate)    Goal: Skin Health and Integrity  Outcome: Ongoing (interventions implemented as appropriate)

## 2018-04-13 NOTE — PROGRESS NOTES
Continued Stay Note  [facility]     Patient Name: Saira Boateng  MRN: [id number]  Today's Date: 4/13/2018    Admit Date: 4/10/2018          Discharge Plan     Row Name 04/13/18 1047       Plan    Plan return home with     Patient/Family in Agreement with Plan yes    Plan Comments Spoke with patient in room.  She denies any needs and plans to return home.  CCP will follow. Geno Florence RN              Discharge Codes    No documentation.           Geno Florence RN

## 2018-04-14 ENCOUNTER — APPOINTMENT (OUTPATIENT)
Dept: NUCLEAR MEDICINE | Facility: HOSPITAL | Age: 73
End: 2018-04-14

## 2018-04-14 LAB
ABO GROUP BLD: NORMAL
BLD GP AB SCN SERPL QL: NEGATIVE
GLUCOSE BLDC GLUCOMTR-MCNC: 135 MG/DL (ref 70–130)
GLUCOSE BLDC GLUCOMTR-MCNC: 141 MG/DL (ref 70–130)
GLUCOSE BLDC GLUCOMTR-MCNC: 144 MG/DL (ref 70–130)
GLUCOSE BLDC GLUCOMTR-MCNC: 161 MG/DL (ref 70–130)
HCT VFR BLD AUTO: 21.1 % (ref 35.6–45.5)
HCT VFR BLD AUTO: 25.9 % (ref 35.6–45.5)
HGB BLD-MCNC: 6.3 G/DL (ref 11.9–15.5)
HGB BLD-MCNC: 8.2 G/DL (ref 11.9–15.5)
RH BLD: POSITIVE
T&S EXPIRATION DATE: NORMAL

## 2018-04-14 PROCEDURE — 86923 COMPATIBILITY TEST ELECTRIC: CPT

## 2018-04-14 PROCEDURE — A9560 TC99M LABELED RBC: HCPCS | Performed by: INTERNAL MEDICINE

## 2018-04-14 PROCEDURE — 85014 HEMATOCRIT: CPT | Performed by: SURGERY

## 2018-04-14 PROCEDURE — P9016 RBC LEUKOCYTES REDUCED: HCPCS

## 2018-04-14 PROCEDURE — 86850 RBC ANTIBODY SCREEN: CPT | Performed by: INTERNAL MEDICINE

## 2018-04-14 PROCEDURE — 85018 HEMOGLOBIN: CPT | Performed by: SURGERY

## 2018-04-14 PROCEDURE — 63710000001 INSULIN ASPART PER 5 UNITS: Performed by: INTERNAL MEDICINE

## 2018-04-14 PROCEDURE — 99233 SBSQ HOSP IP/OBS HIGH 50: CPT | Performed by: INTERNAL MEDICINE

## 2018-04-14 PROCEDURE — 85014 HEMATOCRIT: CPT | Performed by: INTERNAL MEDICINE

## 2018-04-14 PROCEDURE — 36430 TRANSFUSION BLD/BLD COMPNT: CPT

## 2018-04-14 PROCEDURE — 86900 BLOOD TYPING SEROLOGIC ABO: CPT

## 2018-04-14 PROCEDURE — 78278 ACUTE GI BLOOD LOSS IMAGING: CPT

## 2018-04-14 PROCEDURE — 82962 GLUCOSE BLOOD TEST: CPT

## 2018-04-14 PROCEDURE — 25010000002 HEPARIN FLUSH (PORCINE) 100 UNIT/ML SOLUTION: Performed by: INTERNAL MEDICINE

## 2018-04-14 PROCEDURE — 0 TECHNETIUM LABELED RED BLOOD CELLS: Performed by: INTERNAL MEDICINE

## 2018-04-14 PROCEDURE — 86901 BLOOD TYPING SEROLOGIC RH(D): CPT | Performed by: INTERNAL MEDICINE

## 2018-04-14 PROCEDURE — 99232 SBSQ HOSP IP/OBS MODERATE 35: CPT | Performed by: SURGERY

## 2018-04-14 PROCEDURE — 86900 BLOOD TYPING SEROLOGIC ABO: CPT | Performed by: INTERNAL MEDICINE

## 2018-04-14 PROCEDURE — 85018 HEMOGLOBIN: CPT | Performed by: INTERNAL MEDICINE

## 2018-04-14 RX ADMIN — TECHNETIUM TC 99M-LABELED RED BLOOD CELLS 1 DOSE: KIT at 15:10

## 2018-04-14 RX ADMIN — Medication 30 UNITS: at 15:10

## 2018-04-14 RX ADMIN — HYDRALAZINE HYDROCHLORIDE 100 MG: 50 TABLET, FILM COATED ORAL at 20:27

## 2018-04-14 RX ADMIN — LEVOTHYROXINE SODIUM 200 MCG: 100 TABLET ORAL at 06:39

## 2018-04-14 RX ADMIN — HYDROCODONE BITARTRATE AND ACETAMINOPHEN 1 TABLET: 10; 325 TABLET ORAL at 08:54

## 2018-04-14 RX ADMIN — POTASSIUM CHLORIDE 20 MEQ: 750 CAPSULE, EXTENDED RELEASE ORAL at 20:27

## 2018-04-14 RX ADMIN — HYDRALAZINE HYDROCHLORIDE 100 MG: 50 TABLET, FILM COATED ORAL at 08:38

## 2018-04-14 RX ADMIN — HYDROCODONE BITARTRATE AND ACETAMINOPHEN 1 TABLET: 10; 325 TABLET ORAL at 23:35

## 2018-04-14 RX ADMIN — ATENOLOL 100 MG: 50 TABLET ORAL at 08:39

## 2018-04-14 RX ADMIN — POTASSIUM CHLORIDE 20 MEQ: 750 CAPSULE, EXTENDED RELEASE ORAL at 08:39

## 2018-04-14 RX ADMIN — INSULIN ASPART 2 UNITS: 100 INJECTION, SOLUTION INTRAVENOUS; SUBCUTANEOUS at 23:35

## 2018-04-14 RX ADMIN — ATORVASTATIN CALCIUM 20 MG: 20 TABLET, FILM COATED ORAL at 20:27

## 2018-04-14 RX ADMIN — ALLOPURINOL 300 MG: 300 TABLET ORAL at 08:39

## 2018-04-14 NOTE — PROGRESS NOTES
[facility], [facility]  Inpatient Progress Note    Reason for Follow Up:  We're seeing for obscure GI bleeding    Subjective     Interval History:   She continues to bleed with a drop in hemoglobin, CAT scan is pending today.  I reviewed general surgery notes from yesterday and today, scope reports    Current Facility-Administered Medications:   •  allopurinol (ZYLOPRIM) tablet 300 mg, 300 mg, Oral, Daily, Guera Bishop MD, 300 mg at 04/14/18 0839  •  atenolol (TENORMIN) tablet 100 mg, 100 mg, Oral, Q24H, Guera Bishop MD, 100 mg at 04/14/18 0839  •  atorvastatin (LIPITOR) tablet 20 mg, 20 mg, Oral, Nightly, Guera Bishop MD, 20 mg at 04/13/18 2009  •  dextrose (D50W) solution 25 g, 25 g, Intravenous, Q15 Min PRN, Guera Bishop MD  •  dextrose (GLUTOSE) oral gel 15 g, 15 g, Oral, Q15 Min PRN, Guera Bishop MD  •  glucagon (human recombinant) (GLUCAGEN DIAGNOSTIC) injection 1 mg, 1 mg, Subcutaneous, PRN, Guera Bishop MD  •  hydrALAZINE (APRESOLINE) tablet 100 mg, 100 mg, Oral, Q12H, Guera Bishop MD, 100 mg at 04/14/18 0838  •  HYDROcodone-acetaminophen (NORCO)  MG per tablet 1 tablet, 1 tablet, Oral, Q8H PRN, Guera Bishop MD, 1 tablet at 04/14/18 0854  •  insulin aspart (novoLOG) injection 0-7 Units, 0-7 Units, Subcutaneous, Q6H, Guera Bishop MD  •  lactated ringers infusion, 30 mL/hr, Intravenous, Continuous PRN, Winston Esteves MD, Stopped at 04/12/18 1337  •  levothyroxine (SYNTHROID, LEVOTHROID) tablet 200 mcg, 200 mcg, Oral, Q AM, Guera Bishop MD, 200 mcg at 04/14/18 0639  •  metOLazone (ZAROXOLYN) tablet 2.5 mg, 2.5 mg, Oral, Daily, Guera Bishop MD, 2.5 mg at 04/13/18 0924  •  potassium chloride (MICRO-K) CR capsule 20 mEq, 20 mEq, Oral, BID, Guera Bishop MD, 20 mEq at 04/14/18 0839  •  Insert peripheral IV, , , Once **AND** sodium chloride 0.9 % flush 10 mL, 10 mL, Intravenous, PRN, Christie De La Rosa, TAMIKO  Review of Systems:    She is passing dark clots, has  weakness and fatigue all other systems reviewed and    Objective     Vital Signs  Temp:  [97.9 °F (36.6 °C)-98.9 °F (37.2 °C)] 98.2 °F (36.8 °C)  Heart Rate:  [60-76] 60  Resp:  [16-18] 18  BP: (113-149)/(57-77) 113/70  Body mass index is 40.24 kg/m².    Intake/Output Summary (Last 24 hours) at 04/14/18 1245  Last data filed at 04/14/18 1127   Gross per 24 hour   Intake             1360 ml   Output                0 ml   Net             1360 ml     I/O this shift:  In: 640 [P.O.:240; Blood:400]  Out: -      Physical Exam:   General: patient awake, alert and cooperative   Eyes: Normal lids and lashes, no scleral icterus   Neck: supple, normal ROM   Skin: warm and dry, not jaundiced   Cardiovascular: regular rhythm and rate, no murmurs auscultated   Pulm: clear to auscultation bilaterally, regular and unlabored   Abdomen: soft, nontender, nondistended; normal bowel sounds   Rectal: deferred   Extremities: no rash or edema   Psychiatric: Normal mood and behavior; memory intact     Results Review:     I reviewed the patient's new clinical results.      Results from last 7 days  Lab Units 04/14/18  0429 04/13/18  1432 04/12/18  1601  04/11/18  0543 04/10/18  2206   WBC 10*3/mm3  --   --   --   --  8.72 13.14*   HEMOGLOBIN g/dL 6.3* 7.8* 7.1*  < > 6.4* 4.6*   HEMATOCRIT % 21.1* 25.8* 24.6*  < > 20.7* 15.8*   PLATELETS 10*3/mm3  --   --   --   --  261 403   < > = values in this interval not displayed.    Results from last 7 days  Lab Units 04/12/18  0556 04/11/18  0543 04/10/18  2206   SODIUM mmol/L 144 141 148*   POTASSIUM mmol/L 4.0 4.3 4.4   CHLORIDE mmol/L 109* 108* 110*   CO2 mmol/L 23.2 23.4 22.7   BUN mg/dL 18 24* 30*   CREATININE mg/dL 0.95 0.98 1.16*   CALCIUM mg/dL 8.7 7.8* 8.2*   BILIRUBIN mg/dL  --   --  <0.2   ALK PHOS U/L  --   --  79   ALT (SGPT) U/L  --   --  11   AST (SGOT) U/L  --   --  18   GLUCOSE mg/dL 129* 100* 206*         No results found for: LIPASE    Radiology:  CT Abdomen Pelvis With Contrast    Final Result   IMPRESSION :    1. 2.4 cm low density splenic lesion. Favor benign etiology but   recommend follow-up to ensure stability.   2. Tiny left renal lesions as discussed.   3. Extensive diverticulosis           This report was finalized on 4/11/2018 12:04 AM by Emery Abbott MD.          NM GI Blood Loss    (Results Pending)       Assessment/Plan     Patient Active Problem List   Diagnosis   • Acute blood loss anemia   • Diverticulosis of intestine with bleeding     GI bleeding    Plan:    Await tagged scan results today  Follow hemoglobin  Depending on tagged scan results she may need to go to [facility] for angiography with embolization  Depending on tagged red blood cell scan results she may need a repeat colonoscopy  Depending on tagged scan results she may need surgical intervention    I discussed the patients findings and my recommendations with patient and nursing staff.    Magno Arce MD

## 2018-04-14 NOTE — PLAN OF CARE
Problem: Patient Care Overview  Goal: Plan of Care Review   04/14/18 0459   Coping/Psychosocial   Plan of Care Reviewed With patient;family   Plan of Care Review   Progress no change   OTHER   Outcome Summary Pt has a dark red BM with some clots this shift. MDs are aware and have seen them. Hgb 7.8 yesterday. Recheck H/H this am.

## 2018-04-14 NOTE — PROGRESS NOTES
"DAILY PROGRESS NOTE  [facility]    Patient Identification:  Name: Saira Boateng  Age: 73 y.o.  Sex: female  :  1945  MRN: [id number]         Primary Care Physician: Ankita Martin MD    Subjective:passed additional clots today with bowel movement  Interval History:  Admitted 4/10/18 with profound anemia due to GI bleed. Transfused 2 units PRBCs. Had EGD and C-scope. During prep lwas noted to have blood/stool from both  rectum and vagina. Transfused againt.Pt reported continued blood in stool - large clots. H/H dropped again. Still passing some clots. Transfusing 2 additional units.    Objective:    Scheduled Meds:    allopurinol 300 mg Oral Daily   atenolol 100 mg Oral Q24H   atorvastatin 20 mg Oral Nightly   hydrALAZINE 100 mg Oral Q12H   insulin aspart 0-7 Units Subcutaneous Q6H   levothyroxine 200 mcg Oral Q AM   metOLazone 2.5 mg Oral Daily   potassium chloride 20 mEq Oral BID     Continuous Infusions:    lactated ringers 30 mL/hr Last Rate: Stopped (18 1337)       Vital signs in last 24 hours:  Temp:  [98.2 °F (36.8 °C)-98.9 °F (37.2 °C)] 98.2 °F (36.8 °C)  Heart Rate:  [60-76] 60  Resp:  [16-18] 18  BP: (113-149)/(58-77) 113/70    Intake/Output:    Intake/Output Summary (Last 24 hours) at 18 1335  Last data filed at 18 1335   Gross per 24 hour   Intake             1120 ml   Output                0 ml   Net             1120 ml       Exam:  /70   Pulse 60   Temp 98.2 °F (36.8 °C) (Oral)   Resp 18   Ht 157.5 cm (62\")   Wt 99.8 kg (220 lb)   SpO2 99%   BMI 40.24 kg/m²     General Appearance:    Alert, cooperative, no distress, appears stated age   Head:    Normocephalic, without obvious abnormality, atraumatic   Eyes:    PERRL, conjunctiva/corneas clear, EOM's intact, both eyes   Ears:    Normal external ear canals, both ears   Nose:   Nares normal, septum midline, mucosa normal, no drainage    or sinus tenderness   Throat:   Lips, mucosa, and tongue " normal   Neck:   Supple, symmetrical, trachea midline, no adenopathy;     thyroid:  no enlargement/tenderness/nodules; no carotid    bruit or JVD   Back:     no CVA tenderness   Lungs:     Clear to auscultation, decreased at bases bilaterally, respirations unlabored   Chest Wall:    No tenderness or deformity    Heart:    Regular rate and rhythm, S1 and S2 normal, no murmur, rub   or gallop   Abdomen:     Obese. Soft, non-tender, bowel sounds active all four quadrants,  no masses, no hepatomegaly, no splenomegaly   Extremities:   Extremities normal, atraumatic, no cyanosis. LE edema L>R   Pulses:   2+ and symmetric all extremities   Skin:   Skin color, texture, turgor normal, no rashes or lesions   Lymph nodes:   Cervical, supraclavicular, and axillary nodes normal   Neurologic:   CNII-XII grossly intact, normal strength, sensation grossly intact throughout      .       Data Review:  Lab Results   Component Value Date    HGB 6.3 (C) 04/14/2018    HCT 21.1 (L) 04/14/2018     Lab Results   Component Value Date     04/12/2018    K 4.0 04/12/2018     (H) 04/12/2018    CO2 23.2 04/12/2018    BUN 18 04/12/2018    CREATININE 0.95 04/12/2018    GLUCOSE 129 (H) 04/12/2018     Lab Results   Component Value Date    CALCIUM 8.7 04/12/2018     No results found for: AST, ALT, ALKPHOS  No results found for: APTT, INR  No results found for: COLORU, CLARITYU, SPECGRAV, PHUR, PROTEINUR, GLUCOSEU, KETONESU, BLOODU, NITRITE, LEUKOCYTESUR, BILIRUBINUR, UROBILINOGEN, RBCUA, WBCUA, BACTERIA  No results found for: TROPONINT, TROPONINI, BNP  No components found for: HGBA1C;2  No components found for: TSH;2  Patient Active Problem List   Diagnosis Code   • Acute blood loss anemia D62   • Diverticulosis of intestine with bleeding K57.91       Assessment:  Active Problems:    Acute blood loss anemia    Diverticulosis of intestine with bleeding    Anemia    HTN    DM    Recto-vaginal fistula    Plan:  Cont symptom  management  Monitor H/H closely  Transfuse as needed  Follow Surgery recs  Follow GI recs      Guera Bishop MD  4/14/2018  1:35 PM

## 2018-04-14 NOTE — PROGRESS NOTES
"General Surgery  Progress Note    CC: Follow-up lower GI bleed      S: Passing dark blood clots with her BMs as of this morning, mild LLQ abdominal cramps. Hgb dropped down to 6.3 from 7.8    ROS: Positive for abdominal pain, melena, and hematochezia    O:/58   Pulse 73   Temp 98.2 °F (36.8 °C) (Oral)   Resp 18   Ht 157.5 cm (62\")   Wt 99.8 kg (220 lb)   SpO2 100%   BMI 40.24 kg/m²       Intake & Output (last day)       04/13 0701 - 04/14 0700 04/14 0701 - 04/15 0700    P.O. 960 240    I.V. (mL/kg)      Blood      Total Intake(mL/kg) 960 (9.6) 240 (2.4)    Net +960 +240          Unmeasured Urine Occurrence 4 x     Unmeasured Stool Occurrence 3 x             GENERAL: alert, well appearing, and in no distress  HEENT: normocephalic, atraumatic, moist mucous membranes, clear sclera   CHEST: clear to auscultation, no wheezes, rales or rhonchi, symmetric air entry  CARDIAC: regular rate and rhythm    ABDOMEN: soft, obese, nondistended, nontender  EXTREMITIES: no cyanosis, clubbing, or edema   SKIN: Warm and moist, no rashes    LABS    Results from last 7 days  Lab Units 04/14/18  0429 04/13/18  1432 04/12/18  1601  04/11/18  0543 04/10/18  2206   WBC 10*3/mm3  --   --   --   --  8.72 13.14*   HEMOGLOBIN g/dL 6.3* 7.8* 7.1*  < > 6.4* 4.6*   HEMATOCRIT % 21.1* 25.8* 24.6*  < > 20.7* 15.8*   PLATELETS 10*3/mm3  --   --   --   --  261 403   < > = values in this interval not displayed.    Results from last 7 days  Lab Units 04/12/18  0556 04/11/18  0543 04/10/18  2206   SODIUM mmol/L 144 141 148*   POTASSIUM mmol/L 4.0 4.3 4.4   CHLORIDE mmol/L 109* 108* 110*   CO2 mmol/L 23.2 23.4 22.7   BUN mg/dL 18 24* 30*   CREATININE mg/dL 0.95 0.98 1.16*   CALCIUM mg/dL 8.7 7.8* 8.2*   BILIRUBIN mg/dL  --   --  <0.2   ALK PHOS U/L  --   --  79   ALT (SGPT) U/L  --   --  11   AST (SGOT) U/L  --   --  18   GLUCOSE mg/dL 129* 100* 206*             A/P: 73 y.o. female with lower GI bleed, likely diverticular due to descending " colon diverticulosis    I reviewed her EGD and colonoscopy report from Dr. Esteves from Thursday and see there is clearly diverticulosis of the descending and sigmoid colon. It appears the remaining colon was normal and EGD was fairly unremarkable. I agree with checking a tagged RBC scan today to localize any other possible source for bleeding, as the colonoscopy was not very impressive. Transfuse 2U PRBC and continue to monitor Hgb with Q8hr H&H checks. If she drops her Hgb again by tomorrow morning, I think we will need to proceed with surgery. Continue regular diet as tolerated in the interim.    Stacie Fiore MD  General and Endoscopic Surgery  [facility]    [address], [address]  [place], [place], [address]  P: [phone]  F: [phone]

## 2018-04-14 NOTE — PLAN OF CARE
Problem: Patient Care Overview  Goal: Plan of Care Review  Outcome: Ongoing (interventions implemented as appropriate)   04/14/18 0007   Coping/Psychosocial   Plan of Care Reviewed With patient   Plan of Care Review   Progress no change   OTHER   Outcome Summary Hgb 6.3 today, 2 units of PRBC given, nuc med completed, pending results from nuc med, q8 H/H, up with assistance, family at bedside, PRN pain med given, will cont to monitor,       Problem: Anemia (Adult)  Goal: Symptom Improvement  Outcome: Ongoing (interventions implemented as appropriate)      Problem: Fall Risk (Adult)  Goal: Absence of Fall  Outcome: Ongoing (interventions implemented as appropriate)      Problem: Mobility, Physical Impaired (Adult)  Goal: Enhanced Mobility Skills  Outcome: Ongoing (interventions implemented as appropriate)    Goal: Enhanced Functional Ability  Outcome: Ongoing (interventions implemented as appropriate)      Problem: Skin Injury Risk (Adult)  Goal: Skin Health and Integrity  Outcome: Ongoing (interventions implemented as appropriate)

## 2018-04-15 LAB
ABO + RH BLD: NORMAL
ABO + RH BLD: NORMAL
BH BB BLOOD EXPIRATION DATE: NORMAL
BH BB BLOOD EXPIRATION DATE: NORMAL
BH BB BLOOD TYPE BARCODE: 7300
BH BB BLOOD TYPE BARCODE: 7300
BH BB DISPENSE STATUS: NORMAL
BH BB DISPENSE STATUS: NORMAL
BH BB PRODUCT CODE: NORMAL
BH BB PRODUCT CODE: NORMAL
BH BB UNIT NUMBER: NORMAL
BH BB UNIT NUMBER: NORMAL
GLUCOSE BLDC GLUCOMTR-MCNC: 122 MG/DL (ref 70–130)
GLUCOSE BLDC GLUCOMTR-MCNC: 125 MG/DL (ref 70–130)
HCT VFR BLD AUTO: 24.3 % (ref 35.6–45.5)
HCT VFR BLD AUTO: 26.1 % (ref 35.6–45.5)
HCT VFR BLD AUTO: 27.9 % (ref 35.6–45.5)
HGB BLD-MCNC: 7.6 G/DL (ref 11.9–15.5)
HGB BLD-MCNC: 8.2 G/DL (ref 11.9–15.5)
HGB BLD-MCNC: 8.7 G/DL (ref 11.9–15.5)
UNIT  ABO: NORMAL
UNIT  ABO: NORMAL
UNIT  RH: NORMAL
UNIT  RH: NORMAL

## 2018-04-15 PROCEDURE — 99232 SBSQ HOSP IP/OBS MODERATE 35: CPT | Performed by: INTERNAL MEDICINE

## 2018-04-15 PROCEDURE — 85014 HEMATOCRIT: CPT | Performed by: SURGERY

## 2018-04-15 PROCEDURE — 85018 HEMOGLOBIN: CPT | Performed by: SURGERY

## 2018-04-15 PROCEDURE — 82962 GLUCOSE BLOOD TEST: CPT

## 2018-04-15 PROCEDURE — 99232 SBSQ HOSP IP/OBS MODERATE 35: CPT | Performed by: SURGERY

## 2018-04-15 RX ADMIN — HYDRALAZINE HYDROCHLORIDE 100 MG: 50 TABLET, FILM COATED ORAL at 09:32

## 2018-04-15 RX ADMIN — HYDRALAZINE HYDROCHLORIDE 100 MG: 50 TABLET, FILM COATED ORAL at 21:27

## 2018-04-15 RX ADMIN — HYDROCODONE BITARTRATE AND ACETAMINOPHEN 1 TABLET: 10; 325 TABLET ORAL at 21:28

## 2018-04-15 RX ADMIN — ATENOLOL 100 MG: 50 TABLET ORAL at 09:32

## 2018-04-15 RX ADMIN — POTASSIUM CHLORIDE 20 MEQ: 750 CAPSULE, EXTENDED RELEASE ORAL at 09:32

## 2018-04-15 RX ADMIN — ATORVASTATIN CALCIUM 20 MG: 20 TABLET, FILM COATED ORAL at 21:27

## 2018-04-15 RX ADMIN — POTASSIUM CHLORIDE 20 MEQ: 750 CAPSULE, EXTENDED RELEASE ORAL at 21:27

## 2018-04-15 RX ADMIN — LEVOTHYROXINE SODIUM 200 MCG: 100 TABLET ORAL at 07:42

## 2018-04-15 RX ADMIN — ALLOPURINOL 300 MG: 300 TABLET ORAL at 09:32

## 2018-04-15 NOTE — PROGRESS NOTES
[facility]  Inpatient Progress Note    Reason for Follow Up:  Acute blood loss anemia, melena    Subjective     Interval History:   Continues with melena, hemodynamically stable, no bright blood per rectum    Current Facility-Administered Medications:   •  allopurinol (ZYLOPRIM) tablet 300 mg, 300 mg, Oral, Daily, Guera Bishop MD, 300 mg at 04/15/18 0932  •  atenolol (TENORMIN) tablet 100 mg, 100 mg, Oral, Q24H, Guera Bishop MD, 100 mg at 04/15/18 0932  •  atorvastatin (LIPITOR) tablet 20 mg, 20 mg, Oral, Nightly, Guera Bishop MD, 20 mg at 04/14/18 2027  •  dextrose (D50W) solution 25 g, 25 g, Intravenous, Q15 Min PRN, Guera Bishop MD  •  dextrose (GLUTOSE) oral gel 15 g, 15 g, Oral, Q15 Min PRN, Guera Bishop MD  •  glucagon (human recombinant) (GLUCAGEN DIAGNOSTIC) injection 1 mg, 1 mg, Subcutaneous, PRN, Guera Bishop MD  •  hydrALAZINE (APRESOLINE) tablet 100 mg, 100 mg, Oral, Q12H, Guera Bishop MD, 100 mg at 04/15/18 0932  •  HYDROcodone-acetaminophen (NORCO)  MG per tablet 1 tablet, 1 tablet, Oral, Q8H PRN, Guera Bishop MD, 1 tablet at 04/14/18 2335  •  insulin aspart (novoLOG) injection 0-7 Units, 0-7 Units, Subcutaneous, Q6H, Guera Bishop MD, 2 Units at 04/14/18 2335  •  lactated ringers infusion, 30 mL/hr, Intravenous, Continuous PRN, Winston Esteves MD, Stopped at 04/12/18 1337  •  levothyroxine (SYNTHROID, LEVOTHROID) tablet 200 mcg, 200 mcg, Oral, Q AM, Guera Bishop MD, 200 mcg at 04/15/18 0742  •  metOLazone (ZAROXOLYN) tablet 2.5 mg, 2.5 mg, Oral, Daily, Guera Bishop MD, 2.5 mg at 04/13/18 0924  •  potassium chloride (MICRO-K) CR capsule 20 mEq, 20 mEq, Oral, BID, Guera Bishop MD, 20 mEq at 04/15/18 0932  •  Insert peripheral IV, , , Once **AND** sodium chloride 0.9 % flush 10 mL, 10 mL, Intravenous, PRN, Christie De La Rosa, APRN  Review of Systems:    She has weakness all other systems reviewed and negative    Objective     Vital  Signs  Temp:  [97.5 °F (36.4 °C)-98.4 °F (36.9 °C)] 97.5 °F (36.4 °C)  Heart Rate:  [60-74] 74  Resp:  [18] 18  BP: (113-142)/(58-74) 142/74  Body mass index is 40.24 kg/m².    Intake/Output Summary (Last 24 hours) at 04/15/18 1139  Last data filed at 04/14/18 1500   Gross per 24 hour   Intake              660 ml   Output                0 ml   Net              660 ml     No intake/output data recorded.     Physical Exam:   General: patient awake, alert and cooperative   Eyes: Normal lids and lashes, no scleral icterus   Neck: supple, normal ROM   Skin: warm and dry, not jaundiced   Cardiovascular: regular rhythm and rate, no murmurs auscultated   Pulm: clear to auscultation bilaterally, regular and unlabored   Abdomen: soft, nontender, nondistended; normal bowel sounds   Rectal: deferred   Extremities: no rash or edema   Psychiatric: Normal mood and behavior; memory intact     Results Review:     I reviewed the patient's new clinical results.      Results from last 7 days  Lab Units 04/15/18  0740 04/14/18  2353 04/14/18  1903  04/11/18  0543 04/10/18  2206   WBC 10*3/mm3  --   --   --   --  8.72 13.14*   HEMOGLOBIN g/dL 7.6* 8.7* 8.2*  < > 6.4* 4.6*   HEMATOCRIT % 24.3* 27.9* 25.9*  < > 20.7* 15.8*   PLATELETS 10*3/mm3  --   --   --   --  261 403   < > = values in this interval not displayed.    Results from last 7 days  Lab Units 04/12/18  0556 04/11/18  0543 04/10/18  2206   SODIUM mmol/L 144 141 148*   POTASSIUM mmol/L 4.0 4.3 4.4   CHLORIDE mmol/L 109* 108* 110*   CO2 mmol/L 23.2 23.4 22.7   BUN mg/dL 18 24* 30*   CREATININE mg/dL 0.95 0.98 1.16*   CALCIUM mg/dL 8.7 7.8* 8.2*   BILIRUBIN mg/dL  --   --  <0.2   ALK PHOS U/L  --   --  79   ALT (SGPT) U/L  --   --  11   AST (SGOT) U/L  --   --  18   GLUCOSE mg/dL 129* 100* 206*         No results found for: LIPASE    Radiology:  NM GI Blood Loss   Final Result      CT Abdomen Pelvis With Contrast   Final Result   IMPRESSION :    1. 2.4 cm low density splenic  lesion. Favor benign etiology but   recommend follow-up to ensure stability.   2. Tiny left renal lesions as discussed.   3. Extensive diverticulosis           This report was finalized on 4/11/2018 12:04 AM by Emery Abbott MD.              Assessment/Plan     Patient Active Problem List   Diagnosis   • Acute blood loss anemia   • Diverticulosis of intestine with bleeding        GI bleeding, Obscure overt     Plan:     Tagged red blood cell scan was negative for source of bleeding  Follow hemoglobin  If she  develops hemodynamic instability as evidenced by tachycardia and/or hypotension, or bright red blood per rectum,  please send her back for tagged red blood cell scan  If the tagged scan localizes the source, depending on its locations options would include endoscopic evaluation, angiography with embolization versus surgical intervention  Once the hemoglobin has been stable for 48 hours she can be discharged home  The GI team will follow      I discussed the patients findings and my recommendations with patient and family.    Magno Arce MD

## 2018-04-15 NOTE — PROGRESS NOTES
"DAILY PROGRESS NOTE  [facility]    Patient Identification:  Name: Saira Boateng  Age: 73 y.o.  Sex: female  :  1945  MRN: [id number]         Primary Care Physician: Ankita Martin MD    Subjective:passed additional clots today with bowel movement, tarry stool  Interval History:  Admitted 4/10/18 with profound anemia due to GI bleed. Transfused 2 units PRBCs. Had EGD and C-scope. During prep was noted to have blood/stool from both  rectum and vagina. Transfused againt.Pt reported continued blood in stool - large clots. H/H dropped again. Still passing some clots. Transfused 2 additional units. Nuclear scan negative. Difficult situation, appreciate consult assistance.    Objective:    Scheduled Meds:    allopurinol 300 mg Oral Daily   atenolol 100 mg Oral Q24H   atorvastatin 20 mg Oral Nightly   hydrALAZINE 100 mg Oral Q12H   insulin aspart 0-7 Units Subcutaneous Q6H   levothyroxine 200 mcg Oral Q AM   metOLazone 2.5 mg Oral Daily   potassium chloride 20 mEq Oral BID     Continuous Infusions:    lactated ringers 30 mL/hr Last Rate: Stopped (18 1337)       Vital signs in last 24 hours:  Temp:  [97.5 °F (36.4 °C)-98.4 °F (36.9 °C)] 97.5 °F (36.4 °C)  Heart Rate:  [60-74] 74  Resp:  [18] 18  BP: (113-142)/(58-74) 142/74    Intake/Output:    Intake/Output Summary (Last 24 hours) at 04/15/18 0939  Last data filed at 18 1500   Gross per 24 hour   Intake             1060 ml   Output                0 ml   Net             1060 ml       Exam:  /74   Pulse 74   Temp 97.5 °F (36.4 °C) (Oral)   Resp 18   Ht 157.5 cm (62\")   Wt 99.8 kg (220 lb)   SpO2 98%   BMI 40.24 kg/m²     General Appearance:    Alert, cooperative, no distress, appears stated age   Head:    Normocephalic, without obvious abnormality, atraumatic   Eyes:    PERRL, conjunctiva/corneas clear, EOM's intact, both eyes   Ears:    Normal external ear canals, both ears   Nose:   Nares normal, septum midline, mucosa " normal, no drainage    or sinus tenderness   Throat:   Lips, mucosa, and tongue normal   Neck:   Supple, symmetrical, trachea midline, no adenopathy;     thyroid:  no enlargement/tenderness/nodules; no carotid    bruit or JVD   Back:     no CVA tenderness   Lungs:     Clear to auscultation, decreased at bases bilaterally, respirations unlabored   Chest Wall:    No tenderness or deformity    Heart:    Regular rate and rhythm, S1 and S2 normal, no murmur, rub   or gallop   Abdomen:     Obese. Soft, non-tender, bowel sounds active all four quadrants,  no masses, no hepatomegaly, no splenomegaly   Extremities:   Extremities normal, atraumatic, no cyanosis. LE edema L>R   Pulses:   2+ and symmetric all extremities   Skin:   Skin color, texture, turgor normal, no rashes or lesions   Lymph nodes:   Cervical, supraclavicular, and axillary nodes normal   Neurologic:   CNII-XII grossly intact, normal strength, sensation grossly intact throughout      .       Data Review:  Lab Results   Component Value Date    HGB 7.6 (L) 04/15/2018    HCT 24.3 (L) 04/15/2018     No results found for: NA, K, CL, CO2, BUN, CREATININE, GLUCOSE  No results found for: CALCIUM, MG, PHOS  No results found for: AST, ALT, ALKPHOS  No results found for: APTT, INR  No results found for: COLORU, CLARITYU, SPECGRAV, PHUR, PROTEINUR, GLUCOSEU, KETONESU, BLOODU, NITRITE, LEUKOCYTESUR, BILIRUBINUR, UROBILINOGEN, RBCUA, WBCUA, BACTERIA  No results found for: TROPONINT, TROPONINI, BNP  No components found for: HGBA1C;2  No components found for: TSH;2  Patient Active Problem List   Diagnosis Code   • Acute blood loss anemia D62   • Diverticulosis of intestine with bleeding K57.91       Assessment:  Active Problems:    Acute blood loss anemia    Diverticulosis of intestine with bleeding    Anemia    HTN    DM    Recto-vaginal fistula    Plan:  Cont symptom management  Monitor H/H closely  Transfuse as needed  Follow Surgery recs  Follow GI recs      Guera GARNETT  MD [place]  4/15/2018  9:39 AM

## 2018-04-15 NOTE — PROGRESS NOTES
"General Surgery  Progress Note    CC: Follow-up lower GI bleed      S: Still passing some dark blood clots with BMs, but remains hemodynamically stable and tagged RBC scan yesterday demonstrated no source for bleeding. Transfused 2U PRBC yesterday and Hgb responded appropriately from 6.3 to 8.2, now down to 7.6 this morning. She denies any dizziness or lightheadedness.    ROS: Positive for melena    O:/74   Pulse 74   Temp 97.5 °F (36.4 °C) (Oral)   Resp 18   Ht 157.5 cm (62\")   Wt 99.8 kg (220 lb)   SpO2 98%   BMI 40.24 kg/m²       Intake & Output (last day)       04/14 0701 - 04/15 0700 04/15 0701 - 04/16 0700    P.O. 600     Blood 700     Total Intake(mL/kg) 1300 (13)     Net +1300            Unmeasured Urine Occurrence 1 x             GENERAL: alert, well appearing, and in no distress  HEENT: normocephalic, atraumatic, moist mucous membranes, clear sclera   CHEST: clear to auscultation, no wheezes, rales or rhonchi, symmetric air entry  CARDIAC: regular rate and rhythm    ABDOMEN: soft, obese, nondistended, nontender  EXTREMITIES: no cyanosis, clubbing, or edema   SKIN: Warm and moist, no rashes    LABS    Results from last 7 days  Lab Units 04/15/18  0740 04/14/18  2353 04/14/18  1903  04/11/18  0543 04/10/18  2206   WBC 10*3/mm3  --   --   --   --  8.72 13.14*   HEMOGLOBIN g/dL 7.6* 8.7* 8.2*  < > 6.4* 4.6*   HEMATOCRIT % 24.3* 27.9* 25.9*  < > 20.7* 15.8*   PLATELETS 10*3/mm3  --   --   --   --  261 403   < > = values in this interval not displayed.    Results from last 7 days  Lab Units 04/12/18  0556 04/11/18  0543 04/10/18  2206   SODIUM mmol/L 144 141 148*   POTASSIUM mmol/L 4.0 4.3 4.4   CHLORIDE mmol/L 109* 108* 110*   CO2 mmol/L 23.2 23.4 22.7   BUN mg/dL 18 24* 30*   CREATININE mg/dL 0.95 0.98 1.16*   CALCIUM mg/dL 8.7 7.8* 8.2*   BILIRUBIN mg/dL  --   --  <0.2   ALK PHOS U/L  --   --  79   ALT (SGPT) U/L  --   --  11   AST (SGOT) U/L  --   --  18   GLUCOSE mg/dL 129* 100* 206*       "       A/P: 73 y.o. female with lower GI bleed, likely diverticular due to descending colon diverticulosis    Her tagged RBC scan yesterday was unremarkable. Continue H&H checks for now. I am hopeful her bleeding will stop spontaneously, and I see no urgent need for surgical intervention today. The most likely source of bleeding is her descending colon diverticulosis, which should stop bleeding on its own.    Stacie Fiore MD  General and Endoscopic Surgery  [facility]    [address], [address]  [place], [place], [address]  P: [phone]  F: [phone]

## 2018-04-15 NOTE — PLAN OF CARE
Problem: Patient Care Overview  Goal: Plan of Care Review  Outcome: Ongoing (interventions implemented as appropriate)   04/15/18 8501   Coping/Psychosocial   Plan of Care Reviewed With patient   Plan of Care Review   Progress improving   OTHER   Outcome Summary Hgb 8.2, Q8h/h, patient still passing dark marroon soft stools and MD's aware, closely monitor h/h and call for orders to transfuse if needed, up ad alessandra, no PRN's given, 2 units PRBC given yesterday, closely monitor h/h, for tachycardia and hypotension, will cont to monitor       Problem: Anemia (Adult)  Goal: Symptom Improvement  Outcome: Ongoing (interventions implemented as appropriate)      Problem: Fall Risk (Adult)  Goal: Absence of Fall  Outcome: Ongoing (interventions implemented as appropriate)      Problem: Mobility, Physical Impaired (Adult)  Goal: Enhanced Mobility Skills  Outcome: Ongoing (interventions implemented as appropriate)    Goal: Enhanced Functional Ability  Outcome: Ongoing (interventions implemented as appropriate)      Problem: Skin Injury Risk (Adult)  Goal: Skin Health and Integrity  Outcome: Ongoing (interventions implemented as appropriate)

## 2018-04-16 LAB
GLUCOSE BLDC GLUCOMTR-MCNC: 102 MG/DL (ref 70–130)
GLUCOSE BLDC GLUCOMTR-MCNC: 118 MG/DL (ref 70–130)
GLUCOSE BLDC GLUCOMTR-MCNC: 125 MG/DL (ref 70–130)
GLUCOSE BLDC GLUCOMTR-MCNC: 169 MG/DL (ref 70–130)
HCT VFR BLD AUTO: 22.5 % (ref 35.6–45.5)
HCT VFR BLD AUTO: 24.2 % (ref 35.6–45.5)
HCT VFR BLD AUTO: 30.8 % (ref 35.6–45.5)
HGB BLD-MCNC: 6.9 G/DL (ref 11.9–15.5)
HGB BLD-MCNC: 7.5 G/DL (ref 11.9–15.5)
HGB BLD-MCNC: 9.7 G/DL (ref 11.9–15.5)

## 2018-04-16 PROCEDURE — 99232 SBSQ HOSP IP/OBS MODERATE 35: CPT | Performed by: INTERNAL MEDICINE

## 2018-04-16 PROCEDURE — 85014 HEMATOCRIT: CPT | Performed by: SURGERY

## 2018-04-16 PROCEDURE — 85018 HEMOGLOBIN: CPT | Performed by: INTERNAL MEDICINE

## 2018-04-16 PROCEDURE — P9016 RBC LEUKOCYTES REDUCED: HCPCS

## 2018-04-16 PROCEDURE — 25010000002 CEFTRIAXONE PER 250 MG: Performed by: HOSPITALIST

## 2018-04-16 PROCEDURE — 99231 SBSQ HOSP IP/OBS SF/LOW 25: CPT | Performed by: SURGERY

## 2018-04-16 PROCEDURE — 86900 BLOOD TYPING SEROLOGIC ABO: CPT

## 2018-04-16 PROCEDURE — 85018 HEMOGLOBIN: CPT | Performed by: SURGERY

## 2018-04-16 PROCEDURE — 85014 HEMATOCRIT: CPT | Performed by: INTERNAL MEDICINE

## 2018-04-16 PROCEDURE — 36430 TRANSFUSION BLD/BLD COMPNT: CPT

## 2018-04-16 PROCEDURE — 82962 GLUCOSE BLOOD TEST: CPT

## 2018-04-16 RX ORDER — CEFTRIAXONE SODIUM 1 G/50ML
1 INJECTION, SOLUTION INTRAVENOUS EVERY 24 HOURS
Status: DISCONTINUED | OUTPATIENT
Start: 2018-04-16 | End: 2018-04-18

## 2018-04-16 RX ORDER — ATORVASTATIN CALCIUM 10 MG/1
10 TABLET, FILM COATED ORAL NIGHTLY
Status: DISCONTINUED | OUTPATIENT
Start: 2018-04-16 | End: 2018-04-18 | Stop reason: HOSPADM

## 2018-04-16 RX ORDER — PANTOPRAZOLE SODIUM 40 MG/10ML
40 INJECTION, POWDER, LYOPHILIZED, FOR SOLUTION INTRAVENOUS
Status: DISCONTINUED | OUTPATIENT
Start: 2018-04-17 | End: 2018-04-16

## 2018-04-16 RX ORDER — PANTOPRAZOLE SODIUM 40 MG/10ML
40 INJECTION, POWDER, LYOPHILIZED, FOR SOLUTION INTRAVENOUS EVERY 12 HOURS SCHEDULED
Status: DISCONTINUED | OUTPATIENT
Start: 2018-04-16 | End: 2018-04-18

## 2018-04-16 RX ADMIN — ATENOLOL 100 MG: 50 TABLET ORAL at 10:20

## 2018-04-16 RX ADMIN — PANTOPRAZOLE SODIUM 40 MG: 40 INJECTION, POWDER, FOR SOLUTION INTRAVENOUS at 21:36

## 2018-04-16 RX ADMIN — HYDROCODONE BITARTRATE AND ACETAMINOPHEN 1 TABLET: 10; 325 TABLET ORAL at 16:19

## 2018-04-16 RX ADMIN — HYDRALAZINE HYDROCHLORIDE 100 MG: 50 TABLET, FILM COATED ORAL at 10:20

## 2018-04-16 RX ADMIN — CEFTRIAXONE SODIUM 1 G: 1 INJECTION, SOLUTION INTRAVENOUS at 21:36

## 2018-04-16 RX ADMIN — POTASSIUM CHLORIDE 20 MEQ: 750 CAPSULE, EXTENDED RELEASE ORAL at 10:20

## 2018-04-16 RX ADMIN — ALLOPURINOL 300 MG: 300 TABLET ORAL at 10:26

## 2018-04-16 RX ADMIN — LEVOTHYROXINE SODIUM 200 MCG: 100 TABLET ORAL at 07:05

## 2018-04-16 RX ADMIN — HYDRALAZINE HYDROCHLORIDE 100 MG: 50 TABLET, FILM COATED ORAL at 21:36

## 2018-04-16 RX ADMIN — ATORVASTATIN CALCIUM 10 MG: 10 TABLET, FILM COATED ORAL at 21:36

## 2018-04-16 NOTE — PROGRESS NOTES
"DAILY PROGRESS NOTE  [facility]    Patient Identification:  Name: Saira Boateng  Age: 73 y.o.  Sex: female  :  1945  MRN: [id number]         Primary Care Physician: Ankita Martin MD    Subjective:  Doing same  Still with black stools  No abdominal pain or nausea vomiting    Interval History:  Admitted 4/10/18 with profound anemia due to GI bleed. Transfused 2 units PRBCs. Had EGD and C-scope. During prep was noted to have blood/stool from both  rectum and vagina. Transfused againt.Pt reported continued blood in stool - large clots. H/H dropped again. Still passing some clots. Transfused 2 additional units. Nuclear scan negative. Difficult situation, appreciate consult assistance.    Objective:    Scheduled Meds:    allopurinol 300 mg Oral Daily   atenolol 100 mg Oral Q24H   atorvastatin 20 mg Oral Nightly   hydrALAZINE 100 mg Oral Q12H   insulin aspart 0-7 Units Subcutaneous Q6H   levothyroxine 200 mcg Oral Q AM   metOLazone 2.5 mg Oral Daily   potassium chloride 20 mEq Oral BID     Continuous Infusions:    lactated ringers 30 mL/hr Last Rate: Stopped (18 1337)     Exam:  /66   Pulse 65   Temp 98.3 °F (36.8 °C) (Oral)   Resp 18   Ht 157.5 cm (62\")   Wt 99.8 kg (220 lb)   SpO2 99%   BMI 40.24 kg/m²     General Appearance:    Alert, cooperative, no distress, appears stated age   Head:    Normocephalic, without obvious abnormality, atraumatic   Eyes:    PERRL, conjunctiva/corneas clear, EOM's intact, both eyes   Ears:    Normal external ear canals, both ears   Nose:   Nares normal, septum midline, mucosa normal, no drainage    or sinus tenderness   Throat:   Lips, mucosa, and tongue normal   Neck:   Supple, symmetrical, trachea midline, no adenopathy;     thyroid:  no enlargement/tenderness/nodules; no carotid    bruit or JVD   Back:     no CVA tenderness   Lungs:     Clear to auscultation, decreased at bases bilaterally, respirations unlabored   Chest Wall:    No " tenderness or deformity    Heart:    Regular rate and rhythm, S1 and S2 normal, no murmur, rub   or gallop   Abdomen:     Obese. Soft, non-tender, bowel sounds active all four quadrants,  no masses, no hepatomegaly, no splenomegaly   Extremities:   Extremities normal, atraumatic, no cyanosis. LE edema L>R   Pulses:   2+ and symmetric all extremities   Skin:   Skin color, texture, turgor normal, no rashes or lesions   Lymph nodes:   Cervical, supraclavicular, and axillary nodes normal   Neurologic:   CNII-XII grossly intact, normal strength, sensation grossly intact throughout      .       Data Review:  Lab Results (last 24 hours)     Procedure Component Value Units Date/Time    POC Glucose Once [[id number]]  (Normal) Collected:  04/16/18 1213    Specimen:  Blood Updated:  04/16/18 1214     Glucose 102 mg/dL     Narrative:       Meter: [id number] : [id number] Jd NATHAN    Hemoglobin & Hematocrit, Blood [[id number]]  (Abnormal) Collected:  04/16/18 0756    Specimen:  Blood Updated:  04/16/18 0822     Hemoglobin 6.9 (C) g/dL      Hematocrit 22.5 (L) %     POC Glucose Once [[id number]]  (Normal) Collected:  04/16/18 0642    Specimen:  Blood Updated:  04/16/18 0643     Glucose 118 mg/dL     Narrative:       Meter: [id number] : [id number] Lisa NATHAN    Hemoglobin & Hematocrit, Blood [[id number]]  (Abnormal) Collected:  04/16/18 0001    Specimen:  Blood Updated:  04/16/18 0025     Hemoglobin 7.5 (L) g/dL      Hematocrit 24.2 (L) %     POC Glucose Once [[id number]]  (Normal) Collected:  04/16/18 0017    Specimen:  Blood Updated:  04/16/18 0018     Glucose 125 mg/dL     Narrative:       Meter: [id number] : [id number] Lisa NATHAN    Hemoglobin & Hematocrit, Blood [[id number]]  (Abnormal) Collected:  04/15/18 1611    Specimen:  Blood Updated:  04/15/18 1640     Hemoglobin 8.2 (L) g/dL      Hematocrit 26.1 (L) %           Assessment:    Acute blood loss anemia    Diverticulosis of intestine with  bleeding    UTI    HTN    DM    Recto-vaginal fistula    Plan:  CPM  Transfuse 2 units packed RBC  ABX  GI/ Surgery following patient  Monitor H/H closely  Supportive care  IV Protonix  Stress ulcer DVT prophylaxis  Follow closely further recommendation according to hospital course        Yonatan Olson MD  4/16/2018  4:30 PM

## 2018-04-16 NOTE — PROGRESS NOTES
"   LOS: 5 days   Patient Care Team:  Ankita Martin MD as PCP - General (Internal Medicine)    Chief Complaint: bleeding    Subjective     History of Present Illness  Patient continues to pass marroon colored blood clots,   Subjective:  Symptoms:  Worsening.    Activity level: Impaired due to weakness.    Pain:  She reports no pain.        History taken from: patient chart    Objective     Vital Signs  Temp:  [97.9 °F (36.6 °C)-98.5 °F (36.9 °C)] 98.1 °F (36.7 °C)  Heart Rate:  [69-73] 72  Resp:  [16-18] 16  BP: (112-155)/(53-99) 155/67    Objective:  General Appearance:  Comfortable.    Vital signs: (most recent): Blood pressure 155/67, pulse 72, temperature 98.1 °F (36.7 °C), temperature source Oral, resp. rate 16, height 157.5 cm (62\"), weight 99.8 kg (220 lb), SpO2 99 %.  Vital signs are normal.    Output: Producing urine.    Lungs:  Normal effort.    Heart: Normal rate.    Abdomen: Abdomen is soft.  Bowel sounds are normal.   There is no abdominal tenderness.     Neurological: Patient is alert.    Skin:  Warm and dry.              Results Review:     I reviewed the patient's new clinical results.  I reviewed the patient's new imaging results and agree with the interpretation.    Medication Review: done    Assessment/Plan     Active Problems:    Acute blood loss anemia    Diverticulosis of intestine with bleeding      Assessment:  (Gastrointestinal bleed, clinically almost certainly diverticular bleed based on endoscopic findings and continued passage of blood clots. ).     Plan:   (I would transfuse further.  Will discuss with Dr Baldwin further, as far as tattooing colon in anticipation of surgery ).       Winston Esteves MD  04/16/18  11:33 AM    Time: Critical care 15 min      "

## 2018-04-16 NOTE — PLAN OF CARE
Problem: Patient Care Overview  Goal: Plan of Care Review  Outcome: Ongoing (interventions implemented as appropriate)      Problem: Anemia (Adult)  Goal: Symptom Improvement  Outcome: Ongoing (interventions implemented as appropriate)      Problem: Fall Risk (Adult)  Goal: Absence of Fall  Outcome: Ongoing (interventions implemented as appropriate)      Problem: Mobility, Physical Impaired (Adult)  Goal: Enhanced Mobility Skills  Outcome: Ongoing (interventions implemented as appropriate)    Goal: Enhanced Functional Ability  Outcome: Ongoing (interventions implemented as appropriate)      Problem: Skin Injury Risk (Adult)  Goal: Skin Health and Integrity  Outcome: Ongoing (interventions implemented as appropriate)

## 2018-04-16 NOTE — NURSING NOTE
Dr. Bishop called RE: midnight hemoglobin draw result 7.5.  MD stated this is fine, we will wait for the recheck in the morning.

## 2018-04-16 NOTE — PLAN OF CARE
Problem: Patient Care Overview  Goal: Plan of Care Review  Outcome: Ongoing (interventions implemented as appropriate)   04/16/18 1856 04/16/18 1851   Coping/Psychosocial   Plan of Care Reviewed With --  patient   Plan of Care Review   Progress --  improving   OTHER   Outcome Summary hgb dropped to 6.9 today, transfused 2 u PRBCS. recheck for hgb 2200. planning cscope 4/18 then surgery some time after --

## 2018-04-16 NOTE — PROGRESS NOTES
Continued Stay Note  [facility]     Patient Name: Saira Boateng  MRN: [id number]  Today's Date: 4/16/2018    Admit Date: 4/10/2018          Discharge Plan     Row Name 04/16/18 1404       Plan    Plan Return home with     Patient/Family in Agreement with Plan yes    Plan Comments Spoke with patient at bedside.  Plan remains for her to return home with  at [place].  CCP will continue to follow.  BHumeniuk RN              Discharge Codes    No documentation.       Expected Discharge Date and Time     Expected Discharge Date Expected Discharge Time    Apr 18, 2018             Becky S. Humeniuk, RN

## 2018-04-16 NOTE — PLAN OF CARE
Problem: Patient Care Overview  Goal: Plan of Care Review  Outcome: Ongoing (interventions implemented as appropriate)   04/16/18 0539   Coping/Psychosocial   Plan of Care Reviewed With patient   Plan of Care Review   Progress improving   OTHER   Outcome Summary Hemoglobin was 7.5 at midnight, MD called no new orders. q8h H&H. Will continue to monitor       Problem: Anemia (Adult)  Goal: Symptom Improvement  Outcome: Ongoing (interventions implemented as appropriate)      Problem: Fall Risk (Adult)  Goal: Identify Related Risk Factors and Signs and Symptoms  Outcome: Outcome(s) achieved Date Met: 04/16/18    Goal: Absence of Fall  Outcome: Ongoing (interventions implemented as appropriate)      Problem: Mobility, Physical Impaired (Adult)  Goal: Identify Related Risk Factors and Signs and Symptoms  Outcome: Outcome(s) achieved Date Met: 04/16/18    Goal: Enhanced Mobility Skills  Outcome: Ongoing (interventions implemented as appropriate)      Problem: Skin Injury Risk (Adult)  Goal: Identify Related Risk Factors and Signs and Symptoms  Outcome: Outcome(s) achieved Date Met: 04/16/18    Goal: Skin Health and Integrity  Outcome: Ongoing (interventions implemented as appropriate)

## 2018-04-16 NOTE — PROGRESS NOTES
Chief Complaint:    GI bleed    Subjective:    The patient continues to pass dark stool suggestive of melena but the quantity has decreased.  She has no abdominal pain.    Objective:    Temp:  [97.5 °F (36.4 °C)-98.5 °F (36.9 °C)] 98.3 °F (36.8 °C)  Heart Rate:  [69-74] 69  Resp:  [16-18] 18  BP: (112-142)/(53-99) 112/53    Physical Exam   Constitutional: She does not appear ill. No distress.   Abdominal: Soft. There is no tenderness.       Results:    Hemoglobin and hematocrit have been up and down over the past 3 lab draws.  We will await the next hemoglobin and hematocrit.    Assessment/Plan:    The patient has clinical evidence of an ongoing GI bleeding that is presumably due to diverticulosis.  If her hemoglobin and hematocrit continued to drift down, she may need a repeat colonoscopy.  If an area is identified that is the likely source of bleeding, it should be tattooed.    Darwin Baldwin Jr., M.D.

## 2018-04-16 NOTE — PROGRESS NOTES
"Reviewed case further with Dr Baldwin, plan for colonoscopy with tattooing to hopefully delineated better area in question in the colon for purposes of surgery.  At any rate. Discussed with patient, about tomorrow, she indicated she is getting blood today, and she does not want to be \"up and down\" to the bathroom  While getting her several units of blood and opts for wed.  For colonoscopy   "

## 2018-04-17 ENCOUNTER — HOSPITAL ENCOUNTER (OUTPATIENT)
Facility: HOSPITAL | Age: 73
Setting detail: HOSPITAL OUTPATIENT SURGERY
End: 2018-04-17
Attending: INTERNAL MEDICINE | Admitting: INTERNAL MEDICINE

## 2018-04-17 ENCOUNTER — INPATIENT HOSPITAL (OUTPATIENT)
Dept: URBAN - METROPOLITAN AREA HOSPITAL 113 | Facility: HOSPITAL | Age: 73
End: 2018-04-17
Payer: OTHER GOVERNMENT

## 2018-04-17 DIAGNOSIS — K92.2 GASTROINTESTINAL HEMORRHAGE, UNSPECIFIED: ICD-10-CM

## 2018-04-17 DIAGNOSIS — D62 ACUTE POSTHEMORRHAGIC ANEMIA: ICD-10-CM

## 2018-04-17 DIAGNOSIS — K29.50 UNSPECIFIED CHRONIC GASTRITIS WITHOUT BLEEDING: ICD-10-CM

## 2018-04-17 DIAGNOSIS — K92.1 MELENA: ICD-10-CM

## 2018-04-17 DIAGNOSIS — D50.0 IRON DEFICIENCY ANEMIA SECONDARY TO BLOOD LOSS (CHRONIC): ICD-10-CM

## 2018-04-17 DIAGNOSIS — K44.9 DIAPHRAGMATIC HERNIA WITHOUT OBSTRUCTION OR GANGRENE: ICD-10-CM

## 2018-04-17 DIAGNOSIS — K57.30 DIVERTICULOSIS OF LARGE INTESTINE WITHOUT PERFORATION OR ABS: ICD-10-CM

## 2018-04-17 PROBLEM — K57.11: Status: ACTIVE | Noted: 2018-04-17

## 2018-04-17 LAB
ABO + RH BLD: NORMAL
ABO + RH BLD: NORMAL
ALBUMIN SERPL-MCNC: 2.3 G/DL (ref 3.5–5.2)
ALBUMIN/GLOB SERPL: 0.8 G/DL
ALP SERPL-CCNC: 63 U/L (ref 39–117)
ALT SERPL W P-5'-P-CCNC: 12 U/L (ref 1–33)
ANION GAP SERPL CALCULATED.3IONS-SCNC: 9.6 MMOL/L
AST SERPL-CCNC: 20 U/L (ref 1–32)
BASOPHILS # BLD AUTO: 0.02 10*3/MM3 (ref 0–0.2)
BASOPHILS NFR BLD AUTO: 0.3 % (ref 0–1.5)
BH BB BLOOD EXPIRATION DATE: NORMAL
BH BB BLOOD EXPIRATION DATE: NORMAL
BH BB BLOOD TYPE BARCODE: 7300
BH BB BLOOD TYPE BARCODE: 7300
BH BB DISPENSE STATUS: NORMAL
BH BB DISPENSE STATUS: NORMAL
BH BB PRODUCT CODE: NORMAL
BH BB PRODUCT CODE: NORMAL
BH BB UNIT NUMBER: NORMAL
BH BB UNIT NUMBER: NORMAL
BILIRUB SERPL-MCNC: <0.2 MG/DL (ref 0.1–1.2)
BUN BLD-MCNC: 17 MG/DL (ref 8–23)
BUN/CREAT SERPL: 20.2 (ref 7–25)
CALCIUM SPEC-SCNC: 8.3 MG/DL (ref 8.6–10.5)
CHLORIDE SERPL-SCNC: 109 MMOL/L (ref 98–107)
CHOLEST SERPL-MCNC: 110 MG/DL (ref 0–200)
CO2 SERPL-SCNC: 22.4 MMOL/L (ref 22–29)
CREAT BLD-MCNC: 0.84 MG/DL (ref 0.57–1)
DEPRECATED RDW RBC AUTO: 52.1 FL (ref 37–54)
EOSINOPHIL # BLD AUTO: 0.2 10*3/MM3 (ref 0–0.7)
EOSINOPHIL NFR BLD AUTO: 2.7 % (ref 0.3–6.2)
ERYTHROCYTE [DISTWIDTH] IN BLOOD BY AUTOMATED COUNT: 15.7 % (ref 11.7–13)
GFR SERPL CREATININE-BSD FRML MDRD: 80 ML/MIN/1.73
GLOBULIN UR ELPH-MCNC: 2.9 GM/DL
GLUCOSE BLD-MCNC: 142 MG/DL (ref 65–99)
GLUCOSE BLDC GLUCOMTR-MCNC: 104 MG/DL (ref 70–130)
GLUCOSE BLDC GLUCOMTR-MCNC: 127 MG/DL (ref 70–130)
GLUCOSE BLDC GLUCOMTR-MCNC: 127 MG/DL (ref 70–130)
GLUCOSE BLDC GLUCOMTR-MCNC: 99 MG/DL (ref 70–130)
HBA1C MFR BLD: 5.65 % (ref 4.8–5.6)
HCT VFR BLD AUTO: 28.9 % (ref 35.6–45.5)
HDLC SERPL-MCNC: 40 MG/DL (ref 40–60)
HGB BLD-MCNC: 8.9 G/DL (ref 11.9–15.5)
IMM GRANULOCYTES # BLD: 0.02 10*3/MM3 (ref 0–0.03)
IMM GRANULOCYTES NFR BLD: 0.3 % (ref 0–0.5)
LDLC SERPL CALC-MCNC: 56 MG/DL (ref 0–100)
LDLC/HDLC SERPL: 1.4 {RATIO}
LYMPHOCYTES # BLD AUTO: 0.8 10*3/MM3 (ref 0.9–4.8)
LYMPHOCYTES NFR BLD AUTO: 10.6 % (ref 19.6–45.3)
MCH RBC QN AUTO: 28.4 PG (ref 26.9–32)
MCHC RBC AUTO-ENTMCNC: 30.8 G/DL (ref 32.4–36.3)
MCV RBC AUTO: 92.3 FL (ref 80.5–98.2)
MONOCYTES # BLD AUTO: 0.63 10*3/MM3 (ref 0.2–1.2)
MONOCYTES NFR BLD AUTO: 8.4 % (ref 5–12)
NEUTROPHILS # BLD AUTO: 5.86 10*3/MM3 (ref 1.9–8.1)
NEUTROPHILS NFR BLD AUTO: 77.7 % (ref 42.7–76)
NT-PROBNP SERPL-MCNC: 81.7 PG/ML (ref 0–1800)
PLATELET # BLD AUTO: 174 10*3/MM3 (ref 140–500)
PMV BLD AUTO: 11.2 FL (ref 6–12)
POTASSIUM BLD-SCNC: 4.5 MMOL/L (ref 3.5–5.2)
PROT SERPL-MCNC: 5.2 G/DL (ref 6–8.5)
RBC # BLD AUTO: 3.13 10*6/MM3 (ref 3.9–5.2)
SODIUM BLD-SCNC: 141 MMOL/L (ref 136–145)
TRIGL SERPL-MCNC: 70 MG/DL (ref 0–150)
TSH SERPL DL<=0.05 MIU/L-ACNC: 0.52 MIU/ML (ref 0.27–4.2)
UNIT  ABO: NORMAL
UNIT  ABO: NORMAL
UNIT  RH: NORMAL
UNIT  RH: NORMAL
VLDLC SERPL-MCNC: 14 MG/DL (ref 5–40)
WBC NRBC COR # BLD: 7.53 10*3/MM3 (ref 4.5–10.7)

## 2018-04-17 PROCEDURE — 82962 GLUCOSE BLOOD TEST: CPT

## 2018-04-17 PROCEDURE — 99232 SBSQ HOSP IP/OBS MODERATE 35: CPT | Performed by: INTERNAL MEDICINE

## 2018-04-17 PROCEDURE — 80053 COMPREHEN METABOLIC PANEL: CPT | Performed by: HOSPITALIST

## 2018-04-17 PROCEDURE — 83036 HEMOGLOBIN GLYCOSYLATED A1C: CPT | Performed by: HOSPITALIST

## 2018-04-17 PROCEDURE — 25010000002 CEFTRIAXONE PER 250 MG: Performed by: HOSPITALIST

## 2018-04-17 PROCEDURE — 84443 ASSAY THYROID STIM HORMONE: CPT | Performed by: HOSPITALIST

## 2018-04-17 PROCEDURE — 85025 COMPLETE CBC W/AUTO DIFF WBC: CPT | Performed by: HOSPITALIST

## 2018-04-17 PROCEDURE — 80061 LIPID PANEL: CPT | Performed by: HOSPITALIST

## 2018-04-17 PROCEDURE — 83880 ASSAY OF NATRIURETIC PEPTIDE: CPT | Performed by: HOSPITALIST

## 2018-04-17 RX ORDER — MAGNESIUM CARB/ALUMINUM HYDROX 105-160MG
296 TABLET,CHEWABLE ORAL ONCE
Status: COMPLETED | OUTPATIENT
Start: 2018-04-17 | End: 2018-04-17

## 2018-04-17 RX ORDER — POLYETHYLENE GLYCOL 3350, SODIUM CHLORIDE, POTASSIUM CHLORIDE, SODIUM BICARBONATE, AND SODIUM SULFATE 240; 5.84; 2.98; 6.72; 22.72 G/4L; G/4L; G/4L; G/4L; G/4L
4000 POWDER, FOR SOLUTION ORAL DAILY
Status: DISCONTINUED | OUTPATIENT
Start: 2018-04-17 | End: 2018-04-17

## 2018-04-17 RX ORDER — HYDRALAZINE HYDROCHLORIDE 50 MG/1
50 TABLET, FILM COATED ORAL EVERY 8 HOURS SCHEDULED
Status: DISCONTINUED | OUTPATIENT
Start: 2018-04-17 | End: 2018-04-18 | Stop reason: HOSPADM

## 2018-04-17 RX ADMIN — ATENOLOL 100 MG: 50 TABLET ORAL at 10:36

## 2018-04-17 RX ADMIN — Medication 296 ML: at 14:11

## 2018-04-17 RX ADMIN — Medication 296 ML: at 17:34

## 2018-04-17 RX ADMIN — ALLOPURINOL 300 MG: 300 TABLET ORAL at 10:35

## 2018-04-17 RX ADMIN — HYDRALAZINE HYDROCHLORIDE 100 MG: 50 TABLET, FILM COATED ORAL at 10:36

## 2018-04-17 RX ADMIN — CEFTRIAXONE SODIUM 1 G: 1 INJECTION, SOLUTION INTRAVENOUS at 20:28

## 2018-04-17 RX ADMIN — ATORVASTATIN CALCIUM 10 MG: 10 TABLET, FILM COATED ORAL at 20:28

## 2018-04-17 RX ADMIN — PANTOPRAZOLE SODIUM 40 MG: 40 INJECTION, POWDER, FOR SOLUTION INTRAVENOUS at 10:36

## 2018-04-17 RX ADMIN — PANTOPRAZOLE SODIUM 40 MG: 40 INJECTION, POWDER, FOR SOLUTION INTRAVENOUS at 20:28

## 2018-04-17 RX ADMIN — LEVOTHYROXINE SODIUM 200 MCG: 100 TABLET ORAL at 07:05

## 2018-04-17 NOTE — PROGRESS NOTES
The patient continues to have melena and required a transfusion of 2 units of blood yesterday.  She is scheduled for a colonoscopy tomorrow to attempt to control the bleeding or tattoo the area that appears to be the bleeding point.    Further decisions regarding surgical intervention will depend on the colonoscopy.

## 2018-04-17 NOTE — PROGRESS NOTES
"   LOS: 6 days   Patient Care Team:  Ankita Martin MD as PCP - General (Internal Medicine)    Chief Complaint:  bleeding    Subjective     History of Present Illness  Continues to pass blood clots.  NO black stools   Subjective:  Symptoms:  Stable.  She reports weakness.    Diet:  Adequate intake.    Activity level: Impaired due to weakness.    Pain:  She reports no pain.        History taken from: patient chart RN    Objective     Vital Signs  Temp:  [97.9 °F (36.6 °C)-98.9 °F (37.2 °C)] 97.9 °F (36.6 °C)  Heart Rate:  [65-78] 66  Resp:  [16-18] 18  BP: (115-141)/(63-94) 115/86    Objective:  General Appearance:  Comfortable.    Vital signs: (most recent): Blood pressure 115/86, pulse 66, temperature 97.9 °F (36.6 °C), temperature source Oral, resp. rate 18, height 157.5 cm (62\"), weight 99.8 kg (220 lb), SpO2 99 %.  Vital signs are normal.    Output: Producing urine and producing stool.    Lungs:  Normal effort.    Heart: Normal rate.    Abdomen: Abdomen is soft.  There is no abdominal tenderness.     Neurological: Patient is alert and oriented to person, place and time.    Skin:  Warm and dry.              Results Review:     I reviewed the patient's new clinical results.    Medication Review: done    Assessment/Plan     Active Problems:    Acute blood loss anemia    Diverticulosis of intestine with bleeding      Assessment:  (Lower gastrointestinal bleed, almost certainly of diverticular origin, stable but continued bleeding ).     Plan:   (Observation  Plan colonoscopy tomorrow with  tattoo around/above area of bleeding to better facilitate surgical intervention.  Certainly if a vessel can be locatied and treated will do so.  She has a somewhat angulated and narrow sigmoid. Suspect surgery will be ultimate solution   . Case discussed with Dr Baldwin, whose assistance is greatly appreciated. ).       Winston Esteves MD  04/17/18  8:21 AM    Time: Critical care 15 min      "

## 2018-04-17 NOTE — PLAN OF CARE
Problem: Patient Care Overview  Goal: Plan of Care Review  Outcome: Ongoing (interventions implemented as appropriate)   04/17/18 0332   Coping/Psychosocial   Plan of Care Reviewed With patient   Plan of Care Review   Progress improving   OTHER   Outcome Summary no c.o discomfort all shift. 2nd unit of PRBCs completed at start of shift; H&H drawn at 2200 and hgb improved to 9.2. Up w assist x1 to bathroom, tolerating well. VSS. on RA. planning on colonoscopy on wednesday. Will cont to monitor.      Goal: Discharge Needs Assessment  Outcome: Ongoing (interventions implemented as appropriate)    Goal: Interprofessional Rounds/Family Conf  Outcome: Ongoing (interventions implemented as appropriate)      Problem: Anemia (Adult)  Goal: Symptom Improvement  Outcome: Ongoing (interventions implemented as appropriate)      Problem: Fall Risk (Adult)  Goal: Absence of Fall  Outcome: Ongoing (interventions implemented as appropriate)      Problem: Mobility, Physical Impaired (Adult)  Goal: Enhanced Mobility Skills  Outcome: Ongoing (interventions implemented as appropriate)    Goal: Enhanced Functional Ability  Outcome: Ongoing (interventions implemented as appropriate)      Problem: Skin Injury Risk (Adult)  Goal: Skin Health and Integrity  Outcome: Ongoing (interventions implemented as appropriate)

## 2018-04-17 NOTE — PROGRESS NOTES
"DAILY PROGRESS NOTE  [facility]    Patient Identification:  Name: Saira Boateng  Age: 73 y.o.  Sex: female  :  1945  MRN: [id number]         Primary Care Physician: Ankita Martin MD    Subjective:  Doing same  Still with black stools  No abdominal pain or nausea vomiting    Interval History:  Admitted 4/10/18 with profound anemia due to GI bleed. Transfused 2 units PRBCs. Had EGD and C-scope. During prep was noted to have blood/stool from both  rectum and vagina. Transfused againt.Pt reported continued blood in stool - large clots. H/H dropped again. Still passing some clots. Transfused 2 additional units. Nuclear scan negative. Difficult situation, appreciate consult assistance.    Objective:    Scheduled Meds:    allopurinol 300 mg Oral Daily   atenolol 100 mg Oral Q24H   atorvastatin 10 mg Oral Nightly   ceftriaxone 1 g Intravenous Q24H   hydrALAZINE 100 mg Oral Q12H   insulin aspart 0-7 Units Subcutaneous Q6H   levothyroxine 200 mcg Oral Q AM   magnesium citrate 296 mL Oral Once   magnesium citrate 296 mL Oral Once   pantoprazole 40 mg Intravenous Q12H     Continuous Infusions:    lactated ringers 30 mL/hr Last Rate: Stopped (18 1337)     Exam:  /86 (BP Location: Right arm, Patient Position: Lying)   Pulse 66   Temp 97.9 °F (36.6 °C) (Oral)   Resp 18   Ht 157.5 cm (62\")   Wt 99.8 kg (220 lb)   SpO2 99%   BMI 40.24 kg/m²     General Appearance:    Alert, cooperative, no distress, appears stated age   Head:    Normocephalic, without obvious abnormality, atraumatic   Eyes:    PERRL, conjunctiva/corneas clear, EOM's intact, both eyes   Ears:    Normal external ear canals, both ears   Nose:   Nares normal, septum midline, mucosa normal, no drainage    or sinus tenderness   Throat:   Lips, mucosa, and tongue normal   Neck:   Supple, symmetrical, trachea midline, no adenopathy;     thyroid:  no enlargement/tenderness/nodules; no carotid    bruit or JVD   Back:     no CVA " tenderness   Lungs:     Clear to auscultation, decreased at bases bilaterally, respirations unlabored   Chest Wall:    No tenderness or deformity    Heart:    Regular rate and rhythm, S1 and S2 normal, no murmur, rub   or gallop   Abdomen:     Obese. Soft, non-tender, bowel sounds active all four quadrants,  no masses, no hepatomegaly, no splenomegaly   Extremities:   Extremities normal, atraumatic, no cyanosis. LE edema L>R   Pulses:   2+ and symmetric all extremities   Skin:   Skin color, texture, turgor normal, no rashes or lesions   Lymph nodes:   Cervical, supraclavicular, and axillary nodes normal   Neurologic:   CNII-XII grossly intact, normal strength, sensation grossly intact throughout      .       Data Review:  Lab Results (last 24 hours)     Procedure Component Value Units Date/Time    POC Glucose Once [[id number]]  (Normal) Collected:  04/17/18 1157    Specimen:  Blood Updated:  04/17/18 1203     Glucose 104 mg/dL     Narrative:       Meter: [id number] : [id number] Beni NATHAN    POC Glucose Once [[id number]]  (Normal) Collected:  04/17/18 0624    Specimen:  Blood Updated:  04/17/18 0626     Glucose 127 mg/dL     Narrative:       Meter: [id number] : [id number] Shannon NATHAN    BNP [[id number]]  (Normal) Collected:  04/17/18 0351    Specimen:  Blood Updated:  04/17/18 0449     proBNP 81.7 pg/mL     Narrative:       Among patients with dyspnea, NT-proBNP is highly sensitive for the detection of acute congestive heart failure. In addition NT-proBNP of <300 pg/ml effectively rules out acute congestive heart failure with 99% negative predictive value.    TSH [[id number]]  (Normal) Collected:  04/17/18 0351    Specimen:  Blood Updated:  04/17/18 0449     TSH 0.522 mIU/mL     Comprehensive Metabolic Panel [[id number]]  (Abnormal) Collected:  04/17/18 0351    Specimen:  Blood Updated:  04/17/18 0449     Glucose 142 (H) mg/dL      BUN 17 mg/dL      Creatinine 0.84 mg/dL      Sodium 141  mmol/L      Potassium 4.5 mmol/L      Chloride 109 (H) mmol/L      CO2 22.4 mmol/L      Calcium 8.3 (L) mg/dL      Total Protein 5.2 (L) g/dL      Albumin 2.30 (L) g/dL      ALT (SGPT) 12 U/L      AST (SGOT) 20 U/L      Alkaline Phosphatase 63 U/L      Total Bilirubin <0.2 mg/dL      eGFR  African Amer 80 mL/min/1.73      Globulin 2.9 gm/dL      A/G Ratio 0.8 g/dL      BUN/Creatinine Ratio 20.2     Anion Gap 9.6 mmol/L     Narrative:       The MDRD GFR formula is only valid for adults with stable renal function between ages 18 and 70.    Lipid Panel [[id number]] Collected:  04/17/18 0351    Specimen:  Blood Updated:  04/17/18 0443     Total Cholesterol 110 mg/dL      Triglycerides 70 mg/dL      HDL Cholesterol 40 mg/dL      LDL Cholesterol  56 mg/dL      VLDL Cholesterol 14 mg/dL      LDL/HDL Ratio 1.40    Narrative:       Cholesterol Reference Ranges  (U.S. Department of Health and Human Services ATP III Classifications)    Desirable          <200 mg/dL  Borderline High    200-239 mg/dL  High Risk          >240 mg/dL      Triglyceride Reference Ranges  (U.S. Department of Health and Human Services ATP III Classifications)    Normal           <150 mg/dL  Borderline High  150-199 mg/dL  High             200-499 mg/dL  Very High        >500 mg/dL    HDL Reference Ranges  (U.S. Department of Health and Human Services ATP III Classifcations)    Low     <40 mg/dl (major risk factor for CHD)  High    >60 mg/dl ('negative' risk factor for CHD)        LDL Reference Ranges  (U.S. Department of Health and Human Services ATP III Classifcations)    Optimal          <100 mg/dL  Near Optimal     100-129 mg/dL  Borderline High  130-159 mg/dL  High             160-189 mg/dL  Very High        >189 mg/dL    Hemoglobin A1c [[id number]]  (Abnormal) Collected:  04/17/18 0351    Specimen:  Blood Updated:  04/17/18 0421     Hemoglobin A1C 5.65 (H) %     Narrative:       Hemoglobin A1C Ranges:    Increased Risk for Diabetes  5.7% to  6.4%  Diabetes                     >= 6.5%  Diabetic Goal                < 7.0%    CBC & Differential [[id number]] Collected:  04/17/18 0351    Specimen:  Blood Updated:  04/17/18 0419    Narrative:       The following orders were created for panel order CBC & Differential.  Procedure                               Abnormality         Status                     ---------                               -----------         ------                     CBC Auto Differential[[id number]]        Abnormal            Final result                 Please view results for these tests on the individual orders.    CBC Auto Differential [[id number]]  (Abnormal) Collected:  04/17/18 0351    Specimen:  Blood Updated:  04/17/18 0419     WBC 7.53 10*3/mm3      RBC 3.13 (L) 10*6/mm3      Hemoglobin 8.9 (L) g/dL      Hematocrit 28.9 (L) %      MCV 92.3 fL      MCH 28.4 pg      MCHC 30.8 (L) g/dL      RDW 15.7 (H) %      RDW-SD 52.1 fl      MPV 11.2 fL      Platelets 174 10*3/mm3      Neutrophil % 77.7 (H) %      Lymphocyte % 10.6 (L) %      Monocyte % 8.4 %      Eosinophil % 2.7 %      Basophil % 0.3 %      Immature Grans % 0.3 %      Neutrophils, Absolute 5.86 10*3/mm3      Lymphocytes, Absolute 0.80 (L) 10*3/mm3      Monocytes, Absolute 0.63 10*3/mm3      Eosinophils, Absolute 0.20 10*3/mm3      Basophils, Absolute 0.02 10*3/mm3      Immature Grans, Absolute 0.02 10*3/mm3     POC Glucose Once [[id number]]  (Normal) Collected:  04/17/18 0019    Specimen:  Blood Updated:  04/17/18 0020     Glucose 127 mg/dL     Narrative:       Meter: [id number] : [id number] Shannon NATHAN    Hemoglobin & Hematocrit, Blood [[id number]]  (Abnormal) Collected:  04/16/18 2153    Specimen:  Blood Updated:  04/16/18 2219     Hemoglobin 9.7 (L) g/dL      Hematocrit 30.8 (L) %     POC Glucose Once [[id number]]  (Abnormal) Collected:  04/16/18 1850    Specimen:  Blood Updated:  04/16/18 1851     Glucose 169 (H) mg/dL     Narrative:       Meter: [id number]  : [id number] Jd NATHAN          Assessment:    Acute blood loss anemia    Diverticulosis of intestine with bleeding    UTI    HTN    DM    Recto-vaginal fistula    Plan:  CPM  Transfuse PRN  Repeat colonoscopy in a.m.  ABX  GI/ Surgery following patient  Monitor H/H closely  Supportive care  IV Protonix  Stress ulcer DVT prophylaxis  Follow closely further recommendation according to hospital course        Yonatan Olson MD  4/17/2018  1:36 PM

## 2018-04-17 NOTE — PLAN OF CARE
Problem: Patient Care Overview  Goal: Plan of Care Review  Outcome: Ongoing (interventions implemented as appropriate)   04/17/18 8304   Coping/Psychosocial   Plan of Care Reviewed With patient   Plan of Care Review   Progress no change   OTHER   Outcome Summary still with dark red stools with some clots. bowel prepping now. cscope tomorrow. will eventually need surgery       Problem: Anemia (Adult)  Goal: Symptom Improvement  Outcome: Ongoing (interventions implemented as appropriate)      Problem: Fall Risk (Adult)  Goal: Absence of Fall  Outcome: Ongoing (interventions implemented as appropriate)      Problem: Mobility, Physical Impaired (Adult)  Goal: Enhanced Mobility Skills  Outcome: Ongoing (interventions implemented as appropriate)    Goal: Enhanced Functional Ability  Outcome: Ongoing (interventions implemented as appropriate)      Problem: Skin Injury Risk (Adult)  Goal: Skin Health and Integrity  Outcome: Ongoing (interventions implemented as appropriate)

## 2018-04-18 ENCOUNTER — ANESTHESIA (OUTPATIENT)
Dept: GASTROENTEROLOGY | Facility: HOSPITAL | Age: 73
End: 2018-04-18

## 2018-04-18 ENCOUNTER — ANESTHESIA EVENT (OUTPATIENT)
Dept: GASTROENTEROLOGY | Facility: HOSPITAL | Age: 73
End: 2018-04-18

## 2018-04-18 VITALS
WEIGHT: 220 LBS | RESPIRATION RATE: 18 BRPM | DIASTOLIC BLOOD PRESSURE: 70 MMHG | SYSTOLIC BLOOD PRESSURE: 149 MMHG | BODY MASS INDEX: 40.48 KG/M2 | HEART RATE: 68 BPM | TEMPERATURE: 98.5 F | HEIGHT: 62 IN | OXYGEN SATURATION: 99 %

## 2018-04-18 LAB
ANION GAP SERPL CALCULATED.3IONS-SCNC: 10.5 MMOL/L
BASOPHILS # BLD AUTO: 0.02 10*3/MM3 (ref 0–0.2)
BASOPHILS NFR BLD AUTO: 0.2 % (ref 0–1.5)
BUN BLD-MCNC: 11 MG/DL (ref 8–23)
BUN/CREAT SERPL: 14.7 (ref 7–25)
CALCIUM SPEC-SCNC: 8.9 MG/DL (ref 8.6–10.5)
CHLORIDE SERPL-SCNC: 105 MMOL/L (ref 98–107)
CO2 SERPL-SCNC: 26.5 MMOL/L (ref 22–29)
CREAT BLD-MCNC: 0.75 MG/DL (ref 0.57–1)
DEPRECATED RDW RBC AUTO: 53.2 FL (ref 37–54)
EOSINOPHIL # BLD AUTO: 0.16 10*3/MM3 (ref 0–0.7)
EOSINOPHIL NFR BLD AUTO: 1.9 % (ref 0.3–6.2)
ERYTHROCYTE [DISTWIDTH] IN BLOOD BY AUTOMATED COUNT: 15.8 % (ref 11.7–13)
GFR SERPL CREATININE-BSD FRML MDRD: 91 ML/MIN/1.73
GLUCOSE BLD-MCNC: 116 MG/DL (ref 65–99)
GLUCOSE BLDC GLUCOMTR-MCNC: 111 MG/DL (ref 70–130)
GLUCOSE BLDC GLUCOMTR-MCNC: 128 MG/DL (ref 70–130)
HCT VFR BLD AUTO: 23.5 % (ref 35.6–45.5)
HCT VFR BLD AUTO: 26.9 % (ref 35.6–45.5)
HGB BLD-MCNC: 7.3 G/DL (ref 11.9–15.5)
HGB BLD-MCNC: 8.2 G/DL (ref 11.9–15.5)
IMM GRANULOCYTES # BLD: 0.02 10*3/MM3 (ref 0–0.03)
IMM GRANULOCYTES NFR BLD: 0.2 % (ref 0–0.5)
LYMPHOCYTES # BLD AUTO: 0.75 10*3/MM3 (ref 0.9–4.8)
LYMPHOCYTES NFR BLD AUTO: 9 % (ref 19.6–45.3)
MCH RBC QN AUTO: 27.9 PG (ref 26.9–32)
MCHC RBC AUTO-ENTMCNC: 30.5 G/DL (ref 32.4–36.3)
MCV RBC AUTO: 91.5 FL (ref 80.5–98.2)
MONOCYTES # BLD AUTO: 0.73 10*3/MM3 (ref 0.2–1.2)
MONOCYTES NFR BLD AUTO: 8.8 % (ref 5–12)
NEUTROPHILS # BLD AUTO: 6.65 10*3/MM3 (ref 1.9–8.1)
NEUTROPHILS NFR BLD AUTO: 79.9 % (ref 42.7–76)
PLATELET # BLD AUTO: 232 10*3/MM3 (ref 140–500)
PMV BLD AUTO: 11.2 FL (ref 6–12)
POTASSIUM BLD-SCNC: 3.6 MMOL/L (ref 3.5–5.2)
RBC # BLD AUTO: 2.94 10*6/MM3 (ref 3.9–5.2)
SODIUM BLD-SCNC: 142 MMOL/L (ref 136–145)
WBC NRBC COR # BLD: 8.33 10*3/MM3 (ref 4.5–10.7)

## 2018-04-18 PROCEDURE — 0DJD8ZZ INSPECTION OF LOWER INTESTINAL TRACT, VIA NATURAL OR ARTIFICIAL OPENING ENDOSCOPIC: ICD-10-PCS | Performed by: INTERNAL MEDICINE

## 2018-04-18 PROCEDURE — 25010000002 PROPOFOL 10 MG/ML EMULSION: Performed by: ANESTHESIOLOGY

## 2018-04-18 PROCEDURE — 45378 DIAGNOSTIC COLONOSCOPY: CPT | Performed by: INTERNAL MEDICINE

## 2018-04-18 PROCEDURE — 85025 COMPLETE CBC W/AUTO DIFF WBC: CPT | Performed by: HOSPITALIST

## 2018-04-18 PROCEDURE — 85018 HEMOGLOBIN: CPT | Performed by: HOSPITALIST

## 2018-04-18 PROCEDURE — 80048 BASIC METABOLIC PNL TOTAL CA: CPT | Performed by: HOSPITALIST

## 2018-04-18 PROCEDURE — 82962 GLUCOSE BLOOD TEST: CPT

## 2018-04-18 PROCEDURE — 85014 HEMATOCRIT: CPT | Performed by: HOSPITALIST

## 2018-04-18 RX ORDER — CEFUROXIME AXETIL 250 MG/1
250 TABLET ORAL EVERY 12 HOURS SCHEDULED
Qty: 10 TABLET | Refills: 0 | Status: SHIPPED | OUTPATIENT
Start: 2018-04-18 | End: 2018-04-23

## 2018-04-18 RX ORDER — CEFUROXIME AXETIL 250 MG/1
250 TABLET ORAL EVERY 12 HOURS SCHEDULED
Status: DISCONTINUED | OUTPATIENT
Start: 2018-04-18 | End: 2018-04-18 | Stop reason: HOSPADM

## 2018-04-18 RX ORDER — PANTOPRAZOLE SODIUM 40 MG/10ML
40 INJECTION, POWDER, LYOPHILIZED, FOR SOLUTION INTRAVENOUS EVERY 12 HOURS SCHEDULED
Qty: 60 ML | Refills: 0 | Status: SHIPPED | OUTPATIENT
Start: 2018-04-18 | End: 2018-04-21

## 2018-04-18 RX ORDER — ATORVASTATIN CALCIUM 10 MG/1
10 TABLET, FILM COATED ORAL NIGHTLY
Qty: 30 TABLET | Refills: 0 | Status: SHIPPED | OUTPATIENT
Start: 2018-04-18 | End: 2018-05-18

## 2018-04-18 RX ORDER — SODIUM CHLORIDE, SODIUM LACTATE, POTASSIUM CHLORIDE, CALCIUM CHLORIDE 600; 310; 30; 20 MG/100ML; MG/100ML; MG/100ML; MG/100ML
30 INJECTION, SOLUTION INTRAVENOUS CONTINUOUS PRN
Status: DISCONTINUED | OUTPATIENT
Start: 2018-04-18 | End: 2018-04-18

## 2018-04-18 RX ORDER — PROPOFOL 10 MG/ML
VIAL (ML) INTRAVENOUS AS NEEDED
Status: DISCONTINUED | OUTPATIENT
Start: 2018-04-18 | End: 2018-04-18 | Stop reason: SURG

## 2018-04-18 RX ORDER — HYDRALAZINE HYDROCHLORIDE 50 MG/1
50 TABLET, FILM COATED ORAL EVERY 8 HOURS SCHEDULED
Qty: 90 TABLET | Refills: 0 | Status: SHIPPED | OUTPATIENT
Start: 2018-04-18 | End: 2018-05-18

## 2018-04-18 RX ORDER — LIDOCAINE HYDROCHLORIDE 20 MG/ML
INJECTION, SOLUTION INFILTRATION; PERINEURAL AS NEEDED
Status: DISCONTINUED | OUTPATIENT
Start: 2018-04-18 | End: 2018-04-18 | Stop reason: SURG

## 2018-04-18 RX ORDER — PROPOFOL 10 MG/ML
VIAL (ML) INTRAVENOUS CONTINUOUS PRN
Status: DISCONTINUED | OUTPATIENT
Start: 2018-04-18 | End: 2018-04-18 | Stop reason: SURG

## 2018-04-18 RX ADMIN — SODIUM CHLORIDE, POTASSIUM CHLORIDE, SODIUM LACTATE AND CALCIUM CHLORIDE 30 ML/HR: 600; 310; 30; 20 INJECTION, SOLUTION INTRAVENOUS at 08:59

## 2018-04-18 RX ADMIN — PROPOFOL 100 MG: 10 INJECTION, EMULSION INTRAVENOUS at 09:30

## 2018-04-18 RX ADMIN — HYDRALAZINE HYDROCHLORIDE 50 MG: 50 TABLET, FILM COATED ORAL at 00:49

## 2018-04-18 RX ADMIN — PANTOPRAZOLE SODIUM 40 MG: 40 INJECTION, POWDER, FOR SOLUTION INTRAVENOUS at 14:06

## 2018-04-18 RX ADMIN — PROPOFOL 100 MCG/KG/MIN: 10 INJECTION, EMULSION INTRAVENOUS at 09:30

## 2018-04-18 RX ADMIN — LIDOCAINE HYDROCHLORIDE 60 MG: 20 INJECTION, SOLUTION INFILTRATION; PERINEURAL at 09:30

## 2018-04-18 NOTE — PROGRESS NOTES
Continued Stay Note  [facility]     Patient Name: Saira Boateng  MRN: [id number]  Today's Date: 4/18/2018    Admit Date: 4/10/2018          Discharge Plan     Row Name 04/18/18 1459       Plan    Plan Comments DC orders in EPIC.  Patient will be transferring to [facility] for testing that is not available at this facility.  Patient will be under the care of Dr Tena and will be going to ICU room 10.  Call placed to [employer] library and all images are on CD.  Medical records has copied the chart from [facility] and it will be sent with patient.  [facility] arranged for 1530.  Patient , spouse, and nurse all updated. Geno Florence RN    Final Discharge Disposition Code 02 - short term hospital for IP care    Final Note DC to [facility] ( ICU bed 10) via [facility]. Geno Florence RN              Discharge Codes    No documentation.       Expected Discharge Date and Time     Expected Discharge Date Expected Discharge Time    Apr 18, 2018             Geno Florence RN

## 2018-04-18 NOTE — DISCHARGE SUMMARY
Discharge summary    Date of admission 4/10/2018  Date of discharge 4/18/2017    Final diagnosis    Acute blood loss anemia    Active lower GI bleed    Diverticulosis of intestine with bleeding    UTI    HTN    DM    Recto-vaginal fistula    Discharge medications    Current Facility-Administered Medications:   •  allopurinol (ZYLOPRIM) tablet 300 mg, 300 mg, Oral, Daily, Guera Bishop MD, 300 mg at 04/17/18 1035  •  atenolol (TENORMIN) tablet 100 mg, 100 mg, Oral, Q24H, Guera Bishop MD, 100 mg at 04/17/18 1036  •  atorvastatin (LIPITOR) tablet 10 mg, 10 mg, Oral, Nightly, Yonatan Olson MD, 10 mg at 04/17/18 2028  •  cefuroxime (CEFTIN) tablet 250 mg, 250 mg, Oral, Q12H, Yonatan Olson MD  •  hydrALAZINE (APRESOLINE) tablet 50 mg, 50 mg, Oral, Q8H, Yonatan Olson MD, 50 mg at 04/18/18 0049  •  levothyroxine (SYNTHROID, LEVOTHROID) tablet 200 mcg, 200 mcg, Oral, Q AM, Gurea Bishop MD, 200 mcg at 04/17/18 0705  •  pantoprazole (PROTONIX) injection 40 mg, 40 mg, Intravenous, Q12H, Yonatan Olson MD, 40 mg at 04/17/18 2028  •  Insert peripheral IV, , , Once **AND** sodium chloride 0.9 % flush 10 mL, 10 mL, Intravenous, PRN, TAMIKO Buck     Consult obtained  Gastroenterology  Gen. Surgery    Procedure  Upper and lower endoscopy  Bleeding scan    Hospital course  73-year-old female with history of diabetes mellitus hypothyroidism hypertension hyperlipidemia osteoarthritis gout chronic anemia admitted through emergency room with low hemoglobin and black stools.  Patient admitted for further workup.  Patient admitted she underwent upper and lower endoscopy which showed chronic gastritis hiatal hernia  diverticulosis and diverticular hemorrhage.  Patient continued to lose blood colonoscopy repeated this morning and she is actively bleeding and needs interventional radiologist to do angiogram of the to find bleeding site.  Patient will be transferred to [facility] and Dr. Maria will do the procedure.   Patient will be admitted to hospitalist group.  Patient hemoglobin stable at 8.3 this morning and she is also hemodynamically stable.  I have discussed with Dr. SAMAYOA.  I have made several calls to find accepting physician and we're still working on it.  Once we have our accepting physician patient to transfer on IV Protonix and current medication.  Patient also have UTI and getting oral antibiotics.  Patient is afebrile and white count is normal.  Patient required multiple transfusions in this hospitalization and her bleeding scan which was also performed essentially negative.    Discharge diet regular    Activity bedrest    Medication as above    Further care per accepting physician at [facility] and interventional radiologist needs to follow the patient.  Dr. Davis already know about the patient.    DENNY CID MD

## 2018-04-18 NOTE — NURSING NOTE
Report called to ICU nurse Héctor at [facility].  Patient is to be taken to interventional radiology for a angiogram upon arrival to the hospital.  [facility] updated on patient condition.  Patient's vital signs are stable.

## 2018-04-18 NOTE — ANESTHESIA PREPROCEDURE EVALUATION
Anesthesia Evaluation     Patient summary reviewed and Nursing notes reviewed   history of anesthetic complications:               Airway   Mallampati: I  TM distance: <3 FB  Neck ROM: full  no difficulty expected  Dental - normal exam     Pulmonary - normal exam   (+) asthma,   Cardiovascular - normal exam    (+) hypertension, hyperlipidemia,       Neuro/Psych- negative ROS  GI/Hepatic/Renal/Endo    (+)  GI bleeding, diabetes mellitus,     Musculoskeletal     Abdominal  - normal exam    Bowel sounds: normal.   Substance History - negative use     OB/GYN negative ob/gyn ROS         Other   (+) arthritis                     Anesthesia Plan    ASA 3     MAC     Anesthetic plan and risks discussed with patient.

## 2018-04-18 NOTE — ANESTHESIA POSTPROCEDURE EVALUATION
"Patient: Saira Boateng    Procedure Summary     Date:  04/18/18 Room / Location:   [facility] 5 /  [facility]    Anesthesia Start:  0926 Anesthesia Stop:  1118    Procedure:  COLONOSCOPY to Cecum with Evacuation of Clots (N/A ) Diagnosis:       Diverticulosis of small intestine with hemorrhage      (Diverticulosis of small intestine with hemorrhage [K57.11])    Surgeon:  Winston Esteves MD Provider:  Carlos Hines MD    Anesthesia Type:  MAC ASA Status:  3          Anesthesia Type: MAC  Last vitals  BP   159/68 (04/18/18 1139)   Temp   36.8 °C (98.2 °F) (04/18/18 0852)   Pulse   69 (04/18/18 1139)   Resp   16 (04/18/18 1139)     SpO2   96 % (04/18/18 1139)     Post Anesthesia Care and Evaluation    Patient location during evaluation: PACU  Patient participation: complete - patient participated  Level of consciousness: awake  Pain score: 0  Pain management: adequate  Airway patency: patent  Anesthetic complications: No anesthetic complications  PONV Status: none  Cardiovascular status: acceptable  Respiratory status: acceptable  Hydration status: acceptable    Comments: /68 (BP Location: Left arm, Patient Position: Lying)   Pulse 69   Temp 36.8 °C (98.2 °F) (Oral)   Resp 16   Ht 157.5 cm (62\")   Wt 99.8 kg (220 lb)   SpO2 96%   BMI 40.24 kg/m²       "

## 2018-04-18 NOTE — PROGRESS NOTES
Case Management Discharge Note    Final Note: DC to [facility] ( ICU bed 10) via [facility]. Geno Florence RN    Destination     No service coordination in this encounter.      Durable Medical Equipment     No service coordination in this encounter.      Dialysis/Infusion     No service coordination in this encounter.      Home Medical Care     No service coordination in this encounter.      Social Care     No service coordination in this encounter.        Ambulance: [facility]    Final Discharge Disposition Code: 02 - [facility] for [facility]

## 2018-04-18 NOTE — PLAN OF CARE
Problem: Anemia (Adult)  Goal: Symptom Improvement  Outcome: Ongoing (interventions implemented as appropriate)      Problem: Fall Risk (Adult)  Goal: Absence of Fall  Outcome: Ongoing (interventions implemented as appropriate)      Problem: Mobility, Physical Impaired (Adult)  Goal: Enhanced Mobility Skills  Outcome: Ongoing (interventions implemented as appropriate)      Problem: Skin Injury Risk (Adult)  Goal: Skin Health and Integrity  Outcome: Ongoing (interventions implemented as appropriate)

## 2018-04-18 NOTE — PROGRESS NOTES
Case reviewed with Denny Lentz, findings of blood clots most of clot, many clots and red blood in cecum , ascending colon eminating from the ileocecal valve  I did review case with MILENA Traylor at [facility].  He has graciously agree to do an angiography to help locate bleeding site, with possible treatment.  Given the intermittent nature of her bleeding, thought would be prudent she be actually transferred to the hospitalist service there in event angio needs to be repeated if initial is negative.  I have reviewed this with Dr Olson who has graciously agreed to arrange transfer.

## 2018-04-18 NOTE — PROGRESS NOTES
"DAILY PROGRESS NOTE  [facility]    Patient Identification:  Name: Saira Boateng  Age: 73 y.o.  Sex: female  :  1945  MRN: [id number]         Primary Care Physician: Ankita Martin MD    Subjective:  Doing same  Still with black stools  No abdominal pain or nausea vomiting    Interval History:  Admitted 4/10/18 with profound anemia due to GI bleed. Transfused 2 units PRBCs. Had EGD and C-scope. During prep was noted to have blood/stool from both  rectum and vagina. Transfused againt.Pt reported continued blood in stool - large clots. H/H dropped again. Still passing some clots. Transfused 2 additional units. Nuclear scan negative. Difficult situation, appreciate consult assistance.    Objective:    Scheduled Meds:    allopurinol 300 mg Oral Daily   atenolol 100 mg Oral Q24H   atorvastatin 10 mg Oral Nightly   ceftriaxone 1 g Intravenous Q24H   hydrALAZINE 50 mg Oral Q8H   insulin aspart 0-7 Units Subcutaneous Q6H   levothyroxine 200 mcg Oral Q AM   pantoprazole 40 mg Intravenous Q12H     Continuous Infusions:    lactated ringers 30 mL/hr Last Rate: Stopped (18 1337)   lactated ringers 30 mL/hr Last Rate: Stopped (18 1215)     Exam:  /68 (BP Location: Left arm, Patient Position: Lying)   Pulse 69   Temp 98.2 °F (36.8 °C) (Oral)   Resp 16   Ht 157.5 cm (62\")   Wt 99.8 kg (220 lb)   SpO2 96%   BMI 40.24 kg/m²     General Appearance:    Alert, cooperative, no distress, appears stated age   Head:    Normocephalic, without obvious abnormality, atraumatic   Eyes:    PERRL, conjunctiva/corneas clear, EOM's intact, both eyes   Ears:    Normal external ear canals, both ears   Nose:   Nares normal, septum midline, mucosa normal, no drainage    or sinus tenderness   Throat:   Lips, mucosa, and tongue normal   Neck:   Supple, symmetrical, trachea midline, no adenopathy;     thyroid:  no enlargement/tenderness/nodules; no carotid    bruit or JVD   Back:     no CVA tenderness "   Lungs:     Clear to auscultation, decreased at bases bilaterally, respirations unlabored   Chest Wall:    No tenderness or deformity    Heart:    Regular rate and rhythm, S1 and S2 normal, no murmur, rub   or gallop   Abdomen:     Obese. Soft, non-tender, bowel sounds active all four quadrants,  no masses, no hepatomegaly, no splenomegaly   Extremities:   Extremities normal, atraumatic, no cyanosis. LE edema L>R   Pulses:   2+ and symmetric all extremities   Skin:   Skin color, texture, turgor normal, no rashes or lesions   Lymph nodes:   Cervical, supraclavicular, and axillary nodes normal   Neurologic:   CNII-XII grossly intact, normal strength, sensation grossly intact throughout      .       Data Review:  Lab Results (last 24 hours)     Procedure Component Value Units Date/Time    Basic Metabolic Panel [[id number]]  (Abnormal) Collected:  04/18/18 0620    Specimen:  Blood Updated:  04/18/18 0737     Glucose 116 (H) mg/dL      BUN 11 mg/dL      Creatinine 0.75 mg/dL      Sodium 142 mmol/L      Potassium 3.6 mmol/L      Chloride 105 mmol/L      CO2 26.5 mmol/L      Calcium 8.9 mg/dL      eGFR  African Amer 91 mL/min/1.73      BUN/Creatinine Ratio 14.7     Anion Gap 10.5 mmol/L     Narrative:       The MDRD GFR formula is only valid for adults with stable renal function between ages 18 and 70.    CBC & Differential [[id number]] Collected:  04/18/18 0620    Specimen:  Blood Updated:  04/18/18 0711    Narrative:       The following orders were created for panel order CBC & Differential.  Procedure                               Abnormality         Status                     ---------                               -----------         ------                     CBC Auto Differential[[id number]]        Abnormal            Final result                 Please view results for these tests on the individual orders.    CBC Auto Differential [[id number]]  (Abnormal) Collected:  04/18/18 0620    Specimen:  Blood Updated:   04/18/18 0711     WBC 8.33 10*3/mm3      RBC 2.94 (L) 10*6/mm3      Hemoglobin 8.2 (L) g/dL      Hematocrit 26.9 (L) %      MCV 91.5 fL      MCH 27.9 pg      MCHC 30.5 (L) g/dL      RDW 15.8 (H) %      RDW-SD 53.2 fl      MPV 11.2 fL      Platelets 232 10*3/mm3      Neutrophil % 79.9 (H) %      Lymphocyte % 9.0 (L) %      Monocyte % 8.8 %      Eosinophil % 1.9 %      Basophil % 0.2 %      Immature Grans % 0.2 %      Neutrophils, Absolute 6.65 10*3/mm3      Lymphocytes, Absolute 0.75 (L) 10*3/mm3      Monocytes, Absolute 0.73 10*3/mm3      Eosinophils, Absolute 0.16 10*3/mm3      Basophils, Absolute 0.02 10*3/mm3      Immature Grans, Absolute 0.02 10*3/mm3     POC Glucose Once [[id number]]  (Normal) Collected:  04/18/18 0004    Specimen:  Blood Updated:  04/18/18 0006     Glucose 111 mg/dL     Narrative:       Meter: [id number] : [id number] Shannon NATHAN    POC Glucose Once [[id number]]  (Normal) Collected:  04/17/18 1734    Specimen:  Blood Updated:  04/17/18 1739     Glucose 99 mg/dL     Narrative:       Meter: [id number] : [id number] Beni NATHAN        Colonoscopy noted and discussed with Dr. Montero    Assessment:    Acute blood loss anemia    Diverticulosis of intestine with bleeding    UTI    HTN    DM    Recto-vaginal fistula    Plan:  Transfer patient to [facility] for angiography with IR  Discharge summary dictated        Yonatan Olson MD  4/18/2018  12:23 PM

## 2018-10-16 ENCOUNTER — OFFICE VISIT (OUTPATIENT)
Dept: SURGERY | Facility: CLINIC | Age: 73
End: 2018-10-16

## 2018-10-16 VITALS — OXYGEN SATURATION: 97 % | BODY MASS INDEX: 39.56 KG/M2 | WEIGHT: 215 LBS | HEART RATE: 63 BPM | HEIGHT: 62 IN

## 2018-10-16 DIAGNOSIS — N82.3 RECTOVAGINAL FISTULA: Primary | ICD-10-CM

## 2018-10-16 DIAGNOSIS — Z93.2 ILEOSTOMY IN PLACE (HCC): ICD-10-CM

## 2018-10-16 PROCEDURE — 99214 OFFICE O/P EST MOD 30 MIN: CPT | Performed by: SURGERY

## 2018-10-16 RX ORDER — METOLAZONE 2.5 MG/1
2.5 TABLET ORAL DAILY
COMMUNITY

## 2018-10-16 RX ORDER — HYDRALAZINE HYDROCHLORIDE 50 MG/1
50 TABLET, FILM COATED ORAL
COMMUNITY
Start: 2018-05-03

## 2018-10-16 RX ORDER — LOSARTAN POTASSIUM 100 MG/1
TABLET ORAL
COMMUNITY
Start: 2018-10-01

## 2018-10-16 RX ORDER — ATORVASTATIN CALCIUM 20 MG/1
TABLET, FILM COATED ORAL
COMMUNITY
Start: 2018-08-11

## 2018-10-16 RX ORDER — FUROSEMIDE 40 MG/1
40 TABLET ORAL 2 TIMES DAILY
COMMUNITY

## 2018-10-16 RX ORDER — HYDROCODONE BITARTRATE AND ACETAMINOPHEN 5; 325 MG/1; MG/1
TABLET ORAL
COMMUNITY
Start: 2018-08-17

## 2018-10-17 NOTE — PROGRESS NOTES
Subjective   Saira Boateng is a 73 y.o. female who presents to the office for consideration of ileostomy takedown.    History of Present Illness     The patient has a complicated recent past medical history related to a lower GI bleed required a total abdominal colectomy.  She was hospitalized in April 2018 with recurrent lower GI bleeding of unknown etiology.  She had more than one colonoscopy that was unable to identify the source.  She was transferred from [facility] to [facility] for angiographic evaluation.  She underwent 2 separate mesenteric angiograms with no source of bleeding identified.  She continued to have the hip secure bleeding that required ongoing blood transfusions and ultimately underwent a total abdominal colectomy on 4/24/2018 with an ileostomy.    Further complicating her initial case was the fact that she has a known rectovaginal fistula.  At that time she continued to have stool drainage from her vagina.  Obviously the fistula is not an issue at this time because the stool is diverted.    The patient has recovered well from the surgery but has significant ileostomy output.  She is emptying her ileostomy bag 8 or 9 times a day.  It is purely liquid output.  She is not taking any medications to control her output.    The patient has difficulty with mobility that she attributes to sciatica.  She walks with a cane but is slow in her progress.  She is scheduled to see pain management for epidural injections.    Review of Systems   Constitutional: Positive for activity change. Negative for appetite change, fatigue and fever.   HENT: Negative for trouble swallowing and voice change.    Respiratory: Negative for chest tightness and shortness of breath.    Cardiovascular: Negative for chest pain and palpitations.   Gastrointestinal: Positive for diarrhea. Negative for abdominal pain, blood in stool, constipation, nausea and vomiting.   Endocrine: Negative  for cold intolerance and heat intolerance.   Genitourinary: Negative for dysuria and flank pain.   Neurological: Negative for dizziness and light-headedness.   Hematological: Negative for adenopathy. Does not bruise/bleed easily.   Psychiatric/Behavioral: Negative for agitation and confusion.     Past Medical History:   Diagnosis Date   • Anemia    • Arthritis     gouty   • Asthma     ASTHMA ATTACK POST ANESTHESIA   • Diabetes mellitus (CMS/HCC)    • Disease of thyroid gland    • GI bleed    • Hyperlipidemia    • Hypertension    • Injury of back     sciatica     Past Surgical History:   Procedure Laterality Date   • ANKLE SURGERY Left    • APPENDECTOMY N/A     Dr. Morris   • CHOLECYSTECTOMY N/A     Dr. Aguirre   • COLONOSCOPY N/A 4/12/2018    Procedure: COLONOSCOPY TO CECUM;  Surgeon: Winston Esteves MD;  Location: [facility] ENDOSCOPY;  Service: Gastroenterology   • COLONOSCOPY N/A 4/18/2018    Procedure: COLONOSCOPY to Cecum with Evacuation of Clots;  Surgeon: Winston Esteves MD;  Location: [facility] ENDOSCOPY;  Service: Gastroenterology   • ENTEROSCOPY SMALL BOWEL N/A 4/12/2018    Procedure: ENTEROSCOPY SMALL BOWEL WITH BIOPSY;  Surgeon: Winston Esteves MD;  Location: [facility] ENDOSCOPY;  Service: Gastroenterology   • HYSTERECTOMY      Dr. Morris   • JOINT REPLACEMENT      RIGHT SHOULDER REPLACEMENT   • NEPHRECTOMY Right    • THYROID SURGERY     • UPPER GASTROINTESTINAL ENDOSCOPY  2018    Dr. Rivera     Family History   Problem Relation Age of Onset   • No Known Problems Mother    • No Known Problems Father    • No Known Problems Sister    • No Known Problems Brother    • No Known Problems Daughter    • No Known Problems Son    • No Known Problems Maternal Grandmother    • No Known Problems Paternal Grandmother    • No Known Problems Maternal Aunt    • No Known Problems Paternal Aunt    • BRCA 1/2 Neg Hx    • Breast cancer Neg Hx    • Colon cancer Neg Hx    • Endometrial cancer Neg Hx    • Ovarian cancer Neg Hx      Social  History     Social History   • Marital status:      Spouse name: N/A   • Number of children: N/A   • Years of education: N/A     Occupational History   •       Social History Main Topics   • Smoking status: Former Smoker   • Smokeless tobacco: Never Used      Comment: 31 yrs as of 2018   • Alcohol use Yes      Comment: wine social   • Drug use: No   • Sexual activity: Defer     Other Topics Concern   • Not on file     Social History Narrative   • No narrative on file       Objective   Physical Exam   Constitutional: She is oriented to person, place, and time. She appears well-developed and well-nourished.  Non-toxic appearance.   Eyes: EOM are normal. No scleral icterus.   Pulmonary/Chest: Effort normal. No respiratory distress.   Abdominal: Soft. Normal appearance. There is no tenderness.   Neurological: She is alert and oriented to person, place, and time.   Skin: Skin is warm and dry.   Psychiatric: She has a normal mood and affect. Her behavior is normal. Judgment and thought content normal.       Assessment/Plan       The primary encounter diagnosis was Rectovaginal fistula. A diagnosis of Ileostomy in place (CMS/[facility]) was also pertinent to this visit.    The patient has an ileostomy with high output and is very interested in ileostomy takedown.  This was discussed at length with the patient and her .  The current ileostomy output will be untenable if an anastomosis is performed and she has multiple liquid bowel movements per day.  We must first try to control the ileostomy output so that her situation can be manageable when having regular bowel movements.  She does have difficulty with mobility.  She was advised to start Imodium twice a day and to take Metamucil daily.  Her bowel function will be followed in the management can be altered to try to control the number of bowel movements and consistency of the stool.    The patient also has a known rectovaginal fistula that is currently not  an issue because of the ileostomy.  If an anastomosis is performed the rectovaginal fistula will likely become a significant problem especially with high volume of liquid stool.  She has been referred to Dr. Herrera for evaluation of the rectovaginal fistula.    At this point in ileostomy takedown is not appropriate.  If her bowel function can be managed and the rectovaginal fistula definitively addressed, consideration for ileostomy takedown is possible.

## 2018-12-21 NOTE — PROGRESS NOTES
Chief Complaint:    GI bleed    Subjective:    The patient is feeling well but continues to pass dark stool with some clots.  She is not having any bright red blood per rectum.    Objective:    Temp:  [97.9 °F (36.6 °C)-98.4 °F (36.9 °C)] 97.9 °F (36.6 °C)  Heart Rate:  [62-83] 69  Resp:  [16-18] 18  BP: (101-151)/(52-66) 124/57    Physical Exam   Constitutional: She does not appear ill. No distress.   Abdominal: Soft. There is no tenderness.       Results:    Hemoglobin and hematocrit are 7.8 and 25.8 after 1 unit of blood.    Assessment/Plan:    The patient has a GI bleed that has slowed significantly worse stopped.  She is scheduled for another hemoglobin and hematocrit in the morning.  There are no plans for surgical resection at this time.  If an acute recurrence of GI bleeding occurs, characterized by bright red blood per rectum, a GI bleeding scan will be necessary to try to localize the site of bleeding.  If no further bleeding occurs and the patient is ready for discharge, her hemoglobin should be above 8 to allow a physiologic buffer if bleeding recurs.  This may require transfusion of another 1 unit of blood.    Darwin Baldwin Jr., M.D.   21-Dec-2018 06:42

## 2019-02-22 ENCOUNTER — TRANSCRIBE ORDERS (OUTPATIENT)
Dept: ADMINISTRATIVE | Facility: HOSPITAL | Age: 74
End: 2019-02-22

## 2019-02-22 DIAGNOSIS — Z12.39 SCREENING BREAST EXAMINATION: Primary | ICD-10-CM

## 2019-03-20 ENCOUNTER — HOSPITAL ENCOUNTER (OUTPATIENT)
Dept: MAMMOGRAPHY | Facility: HOSPITAL | Age: 74
Discharge: HOME | End: 2019-03-20
Admitting: INTERNAL MEDICINE

## 2019-03-20 DIAGNOSIS — Z12.39 SCREENING BREAST EXAMINATION: ICD-10-CM

## 2019-03-20 PROCEDURE — 77067 SCR MAMMO BI INCL CAD: CPT

## 2019-03-20 PROCEDURE — 77063 BREAST TOMOSYNTHESIS BI: CPT

## 2019-03-21 ENCOUNTER — TRANSCRIBE ORDERS (OUTPATIENT)
Dept: ADMINISTRATIVE | Facility: HOSPITAL | Age: 74
End: 2019-03-21

## 2019-03-21 DIAGNOSIS — R92.8 ABNORMAL MAMMOGRAM: Primary | ICD-10-CM

## 2019-03-27 ENCOUNTER — HOSPITAL ENCOUNTER (OUTPATIENT)
Dept: ULTRASOUND IMAGING | Facility: HOSPITAL | Age: 74
Discharge: HOME | End: 2019-03-27

## 2019-03-27 ENCOUNTER — HOSPITAL ENCOUNTER (OUTPATIENT)
Dept: MAMMOGRAPHY | Facility: HOSPITAL | Age: 74
Discharge: HOME | End: 2019-03-27
Admitting: INTERNAL MEDICINE

## 2019-03-27 DIAGNOSIS — R92.8 ABNORMAL MAMMOGRAM: ICD-10-CM

## 2019-03-27 PROCEDURE — 76642 ULTRASOUND BREAST LIMITED: CPT

## 2019-03-27 PROCEDURE — 77066 DX MAMMO INCL CAD BI: CPT

## (undated) DEVICE — THE TORRENT IRRIGATION SCOPE CONNECTOR IS USED WITH THE TORRENT IRRIGATION TUBING TO PROVIDE IRRIGATION FLUIDS SUCH AS STERILE WATER DURING GASTROINTESTINAL ENDOSCOPIC PROCEDURES WHEN USED IN CONJUNCTION WITH AN IRRIGATION PUMP (OR ELECTROSURGICAL UNIT).: Brand: TORRENT

## (undated) DEVICE — RETRV ROTH NET PLAT UNIV

## (undated) DEVICE — BITEBLOCK OMNI BLOC

## (undated) DEVICE — CANN NASL CO2 TRULINK W/O2 A/

## (undated) DEVICE — TBG 02 CRUSH RESIST LF CLR 7FT

## (undated) DEVICE — Device: Brand: DEFENDO AIR/WATER/SUCTION AND BIOPSY VALVE

## (undated) DEVICE — TUBING, SUCTION, 1/4" X 10', STRAIGHT: Brand: MEDLINE

## (undated) DEVICE — SINGLE-USE BIOPSY FORCEPS: Brand: RADIAL JAW 4